# Patient Record
Sex: MALE | Race: WHITE | Employment: FULL TIME | URBAN - METROPOLITAN AREA
[De-identification: names, ages, dates, MRNs, and addresses within clinical notes are randomized per-mention and may not be internally consistent; named-entity substitution may affect disease eponyms.]

---

## 2017-11-01 ENCOUNTER — OFFICE VISIT (OUTPATIENT)
Dept: FAMILY MEDICINE | Facility: CLINIC | Age: 32
End: 2017-11-01
Payer: COMMERCIAL

## 2017-11-01 VITALS
WEIGHT: 186 LBS | RESPIRATION RATE: 16 BRPM | SYSTOLIC BLOOD PRESSURE: 136 MMHG | HEART RATE: 84 BPM | BODY MASS INDEX: 23.56 KG/M2 | DIASTOLIC BLOOD PRESSURE: 96 MMHG

## 2017-11-01 DIAGNOSIS — G56.23 ULNAR NEUROPATHY OF BOTH UPPER EXTREMITIES: ICD-10-CM

## 2017-11-01 DIAGNOSIS — M54.41 CHRONIC BILATERAL LOW BACK PAIN WITH RIGHT-SIDED SCIATICA: Primary | ICD-10-CM

## 2017-11-01 DIAGNOSIS — G89.29 CHRONIC BILATERAL LOW BACK PAIN WITH RIGHT-SIDED SCIATICA: Primary | ICD-10-CM

## 2017-11-01 PROCEDURE — 99214 OFFICE O/P EST MOD 30 MIN: CPT | Performed by: NURSE PRACTITIONER

## 2017-11-01 RX ORDER — CYCLOBENZAPRINE HCL 10 MG
5-10 TABLET ORAL 3 TIMES DAILY PRN
Qty: 30 TABLET | Refills: 1 | Status: SHIPPED | OUTPATIENT
Start: 2017-11-01 | End: 2019-09-24

## 2017-11-01 RX ORDER — NAPROXEN 500 MG/1
500 TABLET ORAL 2 TIMES DAILY PRN
Qty: 30 TABLET | Refills: 1 | Status: SHIPPED | OUTPATIENT
Start: 2017-11-01 | End: 2019-09-24

## 2017-11-01 NOTE — LETTER
[address] [address]  Phone: [phone]      REPORT OF WORK ABILITY    NOTE TO EMPLOYEE: You must promptly provide a copy of this report to your  employer or worker's compensation insurer, and Qualified Rehabilitation Consultant.    Date: 11/1/2017                     Employee Name: Wes Epperson         YOB: 1985  Medical Record Number: [id number]   Soc.Sec.No: [id number]  Employer: [employer]                Date of Injury: 9/2017  Managed Care Organization / Insurance Company Name: UNKNOWN    Diagnosis: right low back pain with radiculopathy, bilateral ulnar neuropathy  Work Related: yes     MMI: UNKNOWN   Permanent Partial Disability(PPD) likely: UNKNOWN    EMPLOYEE IS ABLE TO WORK: with restrictions from 11/2/17 to 11/8/17 -  Full shift     RESTRICTIONS IF ANY:     Lift, carry no more than:  10 lb. or lessOccasionally (2-4 hours)  Push/Pull:  10 lb. or lessOccasionally (2-4 hours)  Hand/Wrist:  both Avoid repetitive motion  Bend:  Not at all (0 hours)  Stoop:  Not at all (0 hours)  Twist:  Not at all (0 hours)    OTHER RESTRICTIONS: restrictions to be reevaluated based on symptoms in 1 week    TREATMENT PLAN/NOTES: seeing physical therapy, bilateral wrist splints.      Maisha Le, CNP

## 2017-11-01 NOTE — NURSING NOTE
"Chief Complaint   Patient presents with     Back Pain     Numbness       Initial BP (!) 136/96 (BP Location: Right arm, Patient Position: Chair, Cuff Size: Adult Regular)  Pulse 84  Resp 16  Wt 186 lb (84.4 kg)  BMI 23.56 kg/m2 Estimated body mass index is 23.56 kg/(m^2) as calculated from the following:    Height as of 10/20/10: 6' 2.5\" (1.892 m).    Weight as of this encounter: 186 lb (84.4 kg).  Medication Reconciliation: complete     Danielle Romero, CMA      "

## 2017-11-01 NOTE — MR AVS SNAPSHOT
After Visit Summary   11/1/2017    Wes Epperson    MRN: [id number]           Patient Information     Date Of Birth          1985        Visit Information        Provider Department      11/1/2017 10:00 AM Maisha Le APRN [facility]        Today's Diagnoses     Chronic bilateral low back pain with right-sided sciatica    -  1    Ulnar neuropathy of both upper extremities          Care Instructions    1.  For back pain start naproxen (antiinflammatory) 1 pill twice a day for 2 weeks.  flexeril (muscle relaxant) 1 tab three times a day as needed for pain, watch for drowsiness, dont drive after taking.  See physical therapy.  If not improving in 2 weeks call for MRI    2.  For hand numbness I think its due to ulnar neuropathy.  Wear wrist splints at night and start naproxen as above.  If not improving consider hand physical therapy    3.  File work comp claim at work.  Light duty x 1 week, check in via phone or follow up visit in 1 week and let me know how things are going.            Follow-ups after your visit        Additional Services     [facility], [facility] REFERRAL       **This order will print in the [facility] Scheduling Office**    Physical Therapy, Hand Therapy and Chiropractic Care are available through:    *[facility]  *[facility]  *[facility]    Call one number to schedule at any of the above locations: [phone].    Your provider has referred you to: Physical Therapy at [facility] or [facility]    Indication/Reason for Referral: Low Back Pain  Onset of Illness: weeks  Therapy Orders: Evaluate and Treat  Special Programs: None  Special Request: None    Rachel Ramos      Additional Comments for the Therapist or Chiropractor:     Please be aware that coverage of these services is subject to the terms and limitations of your health insurance plan.  Call member services at your health plan with any  "benefit or coverage questions.      Please bring the following to your appointment:    *Your personal calendar for scheduling future appointments  *Comfortable clothing                  Who to contact     If you have questions or need follow up information about today's clinic visit or your schedule please contact [facility] directly at [phone].  Normal or non-critical lab and imaging results will be communicated to you by MyChart, letter or phone within 4 business days after the clinic has received the results. If you do not hear from us within 7 days, please contact the clinic through MyChart or phone. If you have a critical or abnormal lab result, we will notify you by phone as soon as possible.  Submit refill requests through [employer] or call your pharmacy and they will forward the refill request to us. Please allow 3 business days for your refill to be completed.          Additional Information About Your Visit        MyChart Information     [employer] lets you send messages to your doctor, view your test results, renew your prescriptions, schedule appointments and more. To sign up, go to [email or web address] . Click on \"Log in\" on the left side of the screen, which will take you to the Welcome page. Then click on \"Sign up Now\" on the right side of the page.     You will be asked to enter the access code listed below, as well as some personal information. Please follow the directions to create your username and password.     Your access code is: [email or web address]  Expires: 2018 10:42 AM     Your access code will  in 90 days. If you need help or a new code, please call your [place] clinic or [phone].        Care EveryWhere ID     This is your Care EveryWhere ID. This could be used by other organizations to access your [facility] medical records  [id number]        Your Vitals Were     Pulse Respirations BMI (Body Mass Index)             84 16 23.56 kg/m2          Blood Pressure from " Last 3 Encounters:   11/01/17 (!) 136/96   08/02/15 110/73   10/20/10 126/80    Weight from Last 3 Encounters:   11/01/17 186 lb (84.4 kg)   10/20/10 232 lb 12.8 oz (105.6 kg)   08/01/06 200 lb (90.7 kg)              We Performed the Following     [facility] REFERRAL          Today's Medication Changes          These changes are accurate as of: 11/1/17 10:42 AM.  If you have any questions, ask your nurse or doctor.               Start taking these medicines.        Dose/Directions    cyclobenzaprine 10 MG tablet   Commonly known as:  FLEXERIL   Used for:  Chronic bilateral low back pain with right-sided sciatica   Started by:  Maisha Le APRN CNP        Dose:  5-10 mg   Take 0.5-1 tablets (5-10 mg) by mouth 3 times daily as needed for muscle spasms   Quantity:  30 tablet   Refills:  1       naproxen 500 MG tablet   Commonly known as:  NAPROSYN   Used for:  Chronic bilateral low back pain with right-sided sciatica   Started by:  Maisha Le APRN CNP        Dose:  500 mg   Take 1 tablet (500 mg) by mouth 2 times daily as needed for moderate pain   Quantity:  30 tablet   Refills:  1            Where to get your medicines      These medications were sent to [facility] [address]  [address], [facility] [address]     Phone:  [phone]     cyclobenzaprine 10 MG tablet    naproxen 500 MG tablet                Primary Care Provider    [facility] Provider Generic, MD       No address on file        Equal Access to Services     VIELKA LOPEZ : Hadii nicole rhoadeso Sobrendan, waaxda luqadaha, qaybta kaalmada adeasad, miguel angel idiroc panda. So [facility] [phone].    ATENCIÓN: Si habla español, tiene a cook disposición servicios gratuitos de asistencia lingüística. Llame al [phone].    We comply with applicable federal civil rights laws and [place] laws. We do not discriminate on the basis of race, color, national  origin, age, disability, sex, sexual orientation, or gender identity.            Thank you!     Thank you for choosing [facility]  for your care. Our goal is always to provide you with excellent care. Hearing back from our patients is one way we can continue to improve our services. Please take a few minutes to complete the written survey that you may receive in the mail after your visit with us. Thank you!             Your Updated Medication List - Protect others around you: Learn how to safely use, store and throw away your medicines at www.disposemymeds.org.          This list is accurate as of: 11/1/17 10:42 AM.  Always use your most recent med list.                   Brand Name Dispense Instructions for use Diagnosis    cyclobenzaprine 10 MG tablet    FLEXERIL    30 tablet    Take 0.5-1 tablets (5-10 mg) by mouth 3 times daily as needed for muscle spasms    Chronic bilateral low back pain with right-sided sciatica       HYDROcodone-acetaminophen 5-325 MG per tablet    NORCO    10 tablet    Take 1 tablet by mouth every 4 hours as needed for moderate to severe pain        naproxen 500 MG tablet    NAPROSYN    30 tablet    Take 1 tablet (500 mg) by mouth 2 times daily as needed for moderate pain    Chronic bilateral low back pain with right-sided sciatica       NO ACTIVE MEDICATIONS      .    Attention to dressings and sutures

## 2017-11-01 NOTE — PATIENT INSTRUCTIONS
1.  For back pain start naproxen (antiinflammatory) 1 pill twice a day for 2 weeks.  flexeril (muscle relaxant) 1 tab three times a day as needed for pain, watch for drowsiness, dont drive after taking.  See physical therapy.  If not improving in 2 weeks call for MRI    2.  For hand numbness I think its due to ulnar neuropathy.  Wear wrist splints at night and start naproxen as above.  If not improving consider hand physical therapy    3.  File work comp claim at work.  Light duty x 1 week, check in via phone or follow up visit in 1 week and let me know how things are going.

## 2017-11-01 NOTE — PROGRESS NOTES
SUBJECTIVE:   Wes Epperson is a 32 year old male who presents to clinic today for the following health issues:      Back Pain       Duration: 1 month        Specific cause: work-related with sharp pain about 1 month ago    Description:   Location of pain: low back middle  Character of pain: sharp and shooting  Pain radiation:into chest  New numbness or weakness in legs, not attributed to pain:  YES- numbness in both arms and legs    Intensity: Currently 2/10, At its worst 6/10    History:   Pain interferes with job: yes, sometimes when bending over and lifting  History of back problems: no prior back problems  Any previous MRI or X-rays: None  Sees a specialist for back pain:  No  Therapies tried without relief: none    Alleviating factors:   Improved by: smoking marijuana and stretching, hot bath      Precipitating factors:  Worsened by: Lifting and Bending    Functional and Psychosocial Screen (Rachel STarT Back):      Not performed today      HPI: Patient reported lower back pain, which started a month ago after he sustain injury; 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 Pain is located at right lower back, Pain radiates to  posterior thigh at knee level.  pain is described as burning sensation.  Pain is intermittent during the first week then get better and  got worse 2 days ago.  Pain is aggravated by walking and lifting objects.  Pain is rated at 7/10. No history of prior occurence.  Patient take marijuana and perform stretches which relived pain a little. He was unable to attend work today.      No fever, chills,  bowel or bladder incontinence     Left toe tingling intermittent.  Ongoing several weeks.    Numbness/tingling to bilateral arms, starts in elbow and radiates down to ulnar aspect hand and wrist.  Squeezes and and shakes it out, sometimes improves.  When tingling is present its hard to , ongoing since back injury.  Occurring to bilateral hands, right is worse.  No neck pain.       Not currently on light duty.    Has lost 100 pounds.  Intentional wt loss.  Wondering if this plays a role in back pain.    There is no problem list on file for this patient.    History reviewed. No pertinent surgical history.    Social History   Substance Use Topics     Smoking status: Current Every Day Smoker     Packs/day: 0.50     Years: 6.00     Types: Cigarettes     Smokeless tobacco: Never Used     Alcohol use Yes      Comment: rarely     History reviewed. No pertinent family history.      Current Outpatient Prescriptions   Medication Sig Dispense Refill     naproxen (NAPROSYN) 500 MG tablet Take 1 tablet (500 mg) by mouth 2 times daily as needed for moderate pain 30 tablet 1     cyclobenzaprine (FLEXERIL) 10 MG tablet Take 0.5-1 tablets (5-10 mg) by mouth 3 times daily as needed for muscle spasms 30 tablet 1     NO ACTIVE MEDICATIONS .       HYDROcodone-acetaminophen (NORCO) 5-325 MG per tablet Take 1 tablet by mouth every 4 hours as needed for moderate to severe pain (Patient not taking: Reported on 11/1/2017) 10 tablet 0       ROS:  C: NEGATIVE for fever, chills, change in weight  E/M: NEGATIVE for ear, mouth and throat problems  R: NEGATIVE for significant cough or SOB  CV: NEGATIVE for chest pain, palpitations or peripheral edema  : negative for dysuria, hematuria, decreased urinary stream, erectile dysfunction  MUSCULOSKELETAL: POSITIVE  As above   NEURO: As above      OBJECTIVE:                                                    BP (!) 136/96 (BP Location: Right arm, Patient Position: Chair, Cuff Size: Adult Regular)  Pulse 84  Resp 16  Wt 186 lb (84.4 kg)  BMI 23.56 kg/m2   GENERAL APPEARANCE: healthy, alert and no distress  NECK: no adenopathy, no asymmetry, masses, or scars and thyroid normal to palpation  RESP: lungs clear to auscultation - no rales, rhonchi or wheezes  CV: regular rates and rhythm, normal S1 S2, no S3 or S4 and no murmur, click or rub  NEURO: Normal strength and tone,  mentation intact, speech normal, DTR symmetrically normal in lower extremities and gait normal  ORTHO: Lumber/Thoracic Spine Exam: Tender:  right para lumbar muscles  Non-tender:  lumbar spinous processes, left para lumbar muscles  Range of Motion:  Tenderness of to right side of lower back,while performing forward flextion and twisting and extenion of back.  Range of motion not limited by pain  Strength:  able to heel walk, able to toe walk  Special tests:  negative straight leg raises    BILATERAL Elbow Exam: Inspection: no swelling, no ecchymosis, no olecranon bursa swelling, no distal bicep tendon defect  Tender: ulnar groove bilaterally  Non-tender: olecranon bursa and bilateral epicondyl   Range of Motion: flexion:  all normal  Strength: elbow strength full    BILATERAL Wrist Exam: WRIST:  Inspection: no effusion  Range of Motion: normal  Strength: no deficits  Special tests: negative Tinel's, finkelstein, and Phalen's test.         ASSESSMENT/PLAN:                                                    (M54.41,  G89.29) Chronic bilateral low back pain with right-sided sciatica  (primary encounter diagnosis)  Comment: No red flag symptoms for cauda equina these include sharp or stabbing pain in legs or lower extremities bladder and bowel incontinent.  base on symptoms most likely Sciatica v. myofacia pain  Plan: For back pain start naproxen (antiinflammatory) 1 pill twice a day for 2 weeks.  flexeril (muscle relaxant) 1 tab three times a day as needed for pain, watch for drowsiness, dont drive after taking.  See physical therapy.  If not improving in 2 weeks call for MRI.  Letter for work restrictions x 1 week, pt to call in 1 week or follow up in office and will reevaluate.      [facility] REFERRAL,         naproxen (NAPROSYN) 500 MG tablet,         cyclobenzaprine (FLEXERIL) 10 MG tablet            (G56.23) Ulnar neuropathy of both upper extremities  Comment: no neck pain to suggest cervical  radiculopathy, exam consistent with ulnar neuropathy  Plan:Wear wrist splints at night and start naproxen as above.  If not improving consider hand physical therapy.  Work restrictions completed.      See Patient Instructions    Maisha Le CNP  [facility]    Patient Instructions   1.  For back pain start naproxen (antiinflammatory) 1 pill twice a day for 2 weeks.  flexeril (muscle relaxant) 1 tab three times a day as needed for pain, watch for drowsiness, dont drive after taking.  See physical therapy.  If not improving in 2 weeks call for MRI    2.  For hand numbness I think its due to ulnar neuropathy.  Wear wrist splints at night and start naproxen as above.  If not improving consider hand physical therapy    3.  File work comp claim at work.  Light duty x 1 week, check in via phone or follow up visit in 1 week and let me know how things are going.

## 2019-06-07 ENCOUNTER — OFFICE VISIT (OUTPATIENT)
Dept: URGENT CARE | Facility: URGENT CARE | Age: 34
End: 2019-06-07
Payer: COMMERCIAL

## 2019-06-07 VITALS
HEIGHT: 74 IN | WEIGHT: 189 LBS | BODY MASS INDEX: 24.26 KG/M2 | DIASTOLIC BLOOD PRESSURE: 80 MMHG | OXYGEN SATURATION: 97 % | SYSTOLIC BLOOD PRESSURE: 130 MMHG | TEMPERATURE: 99 F | HEART RATE: 89 BPM

## 2019-06-07 DIAGNOSIS — N50.89 TESTICULAR LUMP: Primary | ICD-10-CM

## 2019-06-07 LAB
ALBUMIN UR-MCNC: NEGATIVE MG/DL
APPEARANCE UR: CLEAR
BILIRUB UR QL STRIP: NEGATIVE
COLOR UR AUTO: YELLOW
GLUCOSE UR STRIP-MCNC: NEGATIVE MG/DL
HGB UR QL STRIP: NEGATIVE
KETONES UR STRIP-MCNC: NEGATIVE MG/DL
LEUKOCYTE ESTERASE UR QL STRIP: NEGATIVE
NITRATE UR QL: NEGATIVE
PH UR STRIP: 7 PH (ref 5–7)
SOURCE: NORMAL
SP GR UR STRIP: 1.02 (ref 1–1.03)
UROBILINOGEN UR STRIP-ACNC: 1 EU/DL (ref 0.2–1)

## 2019-06-07 PROCEDURE — 81003 URINALYSIS AUTO W/O SCOPE: CPT | Performed by: FAMILY MEDICINE

## 2019-06-07 PROCEDURE — 99214 OFFICE O/P EST MOD 30 MIN: CPT | Performed by: FAMILY MEDICINE

## 2019-06-07 ASSESSMENT — MIFFLIN-ST. JEOR: SCORE: 1857.05

## 2019-06-08 NOTE — PROGRESS NOTES
SUBJECTIVE:   Chief Complaint   Patient presents with     Urgent Care     Pain     For past year has had intermittent shooting pain in left testicle; last few months has become constant; worse in last few weeks, rated 8/10.  2 weeks ago started to notice a lump on left testicle.  2 days ago started to have pain in right testicle as well.  Has been taking tylenol and aspirin but has not helped pain.       Concern: Testicular pain   Description of the problem : Patient is a 34-year-old male with a past medical history of tobacco abuse who presents to the clinic for evaluation of left testicular pain.  He reports that symptoms initially started about a year ago  Pain only occurred intermittently.  His symptoms progressed and now he has constant pain in the past few weeks.  He rates pain at 8/10 in severity.  He denies any urinary frequency, urgency, dysuria, and urethral discharge.  He also denies any abdominal pain, fever, chills, nausea, and vomiting.      Review of Systems review of system negative except as mentioned in HPI.       No past medical history on file.  No family history on file.  Current Outpatient Medications   Medication Sig Dispense Refill     Acetaminophen 325 MG CAPS Take 325-650 mg by mouth every 4 hours as needed       aspirin (ASA) 325 MG EC tablet Take 650 mg by mouth every 6 hours as needed for moderate pain       cyclobenzaprine (FLEXERIL) 10 MG tablet Take 0.5-1 tablets (5-10 mg) by mouth 3 times daily as needed for muscle spasms (Patient not taking: Reported on 6/7/2019) 30 tablet 1     HYDROcodone-acetaminophen (NORCO) 5-325 MG per tablet Take 1 tablet by mouth every 4 hours as needed for moderate to severe pain (Patient not taking: Reported on 11/1/2017) 10 tablet 0     naproxen (NAPROSYN) 500 MG tablet Take 1 tablet (500 mg) by mouth 2 times daily as needed for moderate pain (Patient not taking: Reported on 6/7/2019) 30 tablet 1     NO ACTIVE MEDICATIONS .       Social History  "    Tobacco Use     Smoking status: Current Every Day Smoker     Packs/day: 0.50     Years: 6.00     Pack years: 3.00     Types: Cigarettes     Smokeless tobacco: Never Used   Substance Use Topics     Alcohol use: Yes     Comment: rarely       OBJECTIVE  /80   Pulse 89   Temp 99  F (37.2  C) (Oral)   Ht 1.88 m (6' 2\")   Wt 85.7 kg (189 lb)   SpO2 97%   BMI 24.27 kg/m      Physical Exam   Constitutional: He appears well-developed and well-nourished. No distress.   HENT:   Head: Normocephalic and atraumatic.   Eyes: Conjunctivae are normal.   Genitourinary:   Genitourinary Comments: Left testicle tenderness on palpation.  Patient also has 3 to 4 cm lump around 12:00 region.  No signs of varicocele, hydrocele, or hernia noted.  No urethral discharge noted   Neurological: He is alert.   Skin: Skin is warm. He is not diaphoretic.   Psychiatric: He has a normal mood and affect.       Labs:  Results for orders placed or performed in visit on 06/07/19 (from the past 24 hour(s))   *UA reflex to Microscopic and Culture ([place] and [facility] (except [place] and [place])   Result Value Ref Range    Color Urine Yellow     Appearance Urine Clear     Glucose Urine Negative NEG^Negative mg/dL    Bilirubin Urine Negative NEG^Negative    Ketones Urine Negative NEG^Negative mg/dL    Specific Gravity Urine 1.020 1.003 - 1.035    Blood Urine Negative NEG^Negative    pH Urine 7.0 5.0 - 7.0 pH    Protein Albumin Urine Negative NEG^Negative mg/dL    Urobilinogen Urine 1.0 0.2 - 1.0 EU/dL    Nitrite Urine Negative NEG^Negative    Leukocyte Esterase Urine Negative NEG^Negative    Source Midstream Urine        X-Ray was not done.    ASSESSMENT:    Wes was seen today for urgent care and pain.    Diagnoses and all orders for this visit:    Testicular lump:  This is a 34-year-old male who presents to clinic for evaluation of left testicle lump possible differentials includes testicular cancer versus spermatocele versus " varicocele. His physical examination was remarkable for left testicular tenderness with palpable nodule. Recommended testicular ultrasound for further evaluation. Urinalysis was unremarkable.  -     US Testicular and Scrotum; Future    Other orders  -     *UA reflex to Microscopic and Culture ([place] and [facility] (except [place] and [place])      Tash Amezquita MD

## 2019-06-10 ENCOUNTER — ANCILLARY PROCEDURE (OUTPATIENT)
Dept: ULTRASOUND IMAGING | Facility: CLINIC | Age: 34
End: 2019-06-10
Attending: FAMILY MEDICINE
Payer: COMMERCIAL

## 2019-06-10 DIAGNOSIS — N50.89 TESTICULAR LUMP: ICD-10-CM

## 2019-07-10 ENCOUNTER — OFFICE VISIT (OUTPATIENT)
Dept: FAMILY MEDICINE | Facility: CLINIC | Age: 34
End: 2019-07-10
Payer: COMMERCIAL

## 2019-07-10 VITALS
HEART RATE: 83 BPM | SYSTOLIC BLOOD PRESSURE: 132 MMHG | BODY MASS INDEX: 25.16 KG/M2 | RESPIRATION RATE: 16 BRPM | OXYGEN SATURATION: 95 % | DIASTOLIC BLOOD PRESSURE: 104 MMHG | WEIGHT: 196 LBS

## 2019-07-10 DIAGNOSIS — I86.1 BILATERAL VARICOCELES: ICD-10-CM

## 2019-07-10 DIAGNOSIS — F17.200 TOBACCO DEPENDENCE SYNDROME: Primary | ICD-10-CM

## 2019-07-10 PROCEDURE — 99214 OFFICE O/P EST MOD 30 MIN: CPT | Performed by: FAMILY MEDICINE

## 2019-07-10 NOTE — PROGRESS NOTES
Subjective     Wes Epperson is a 34 year old male who presents to clinic today for the following health issues:    HPI     Testicular pain described as chronic aching for the past year, initially left only, but now involving both testicles, and now associated with a lump in left testicle that has been growing over the past few weeks. He was seen in  a few days ago and had a testicular US that showed bilateral vericocele.This is a new diagnosis for him. Pain is aggravated by activity (walking). He denies any triggering events (ie, denies, heavy lifting,etc)    He denies any risk of STD, sexual dysfunction, penile discharge, fevers, chills, urinary or stool changes.    Pain        For past year has had intermittent shooting pain in left testicle; last few months has become constant; worse in last few weeks, rated 8/10.  2 weeks ago started to notice a lump on left testicle.  2 days ago started to have pain in right testicle as well.  Has been taking tylenol and aspirin but has not helped pain.      Pain is still there and it is radiating to his abdomin area (left side) . The pain is sharp and has been constant lately. They told him that there may be some sort or blockage there. It is worse when he is sitting or driving.   He also noticed that his vein are a dark blue/ purple and only certain parts are like that. Tender to the touch.       There is no problem list on file for this patient.    Past Surgical History:   Procedure Laterality Date     NO HISTORY OF SURGERY         Social History     Tobacco Use     Smoking status: Current Every Day Smoker     Packs/day: 0.50     Years: 6.00     Pack years: 3.00     Types: Cigarettes     Smokeless tobacco: Never Used   Substance Use Topics     Alcohol use: Yes     Comment: rarely     No family history on file.      Current Outpatient Medications   Medication Sig Dispense Refill     varenicline (CHANTIX TARYN) 0.5 MG X 11 & 1 MG X 42 tablet Take 0.5 mg tab daily for 3  days, THEN 0.5 mg tab twice daily for 4 days, THEN 1 mg twice daily. 53 tablet 0     Acetaminophen 325 MG CAPS Take 325-650 mg by mouth every 4 hours as needed       aspirin (ASA) 325 MG EC tablet Take 650 mg by mouth every 6 hours as needed for moderate pain       cyclobenzaprine (FLEXERIL) 10 MG tablet Take 0.5-1 tablets (5-10 mg) by mouth 3 times daily as needed for muscle spasms (Patient not taking: Reported on 6/7/2019) 30 tablet 1     HYDROcodone-acetaminophen (NORCO) 5-325 MG per tablet Take 1 tablet by mouth every 4 hours as needed for moderate to severe pain (Patient not taking: Reported on 11/1/2017) 10 tablet 0     naproxen (NAPROSYN) 500 MG tablet Take 1 tablet (500 mg) by mouth 2 times daily as needed for moderate pain (Patient not taking: Reported on 6/7/2019) 30 tablet 1     NO ACTIVE MEDICATIONS .       No Known Allergies  BP Readings from Last 3 Encounters:   07/10/19 (!) 132/104   06/07/19 130/80   11/01/17 (!) 136/96    Wt Readings from Last 3 Encounters:   07/10/19 88.9 kg (196 lb)   06/07/19 85.7 kg (189 lb)   11/01/17 84.4 kg (186 lb)        Reviewed and updated as needed this visit by Provider  Med Hx         Review of Systems   ROS COMP: Constitutional, HEENT, cardiovascular, pulmonary, GI, , musculoskeletal, neuro, skin, endocrine and psych systems are negative, except as otherwise noted.      Objective    BP (!) 132/104 (BP Location: Left arm, Patient Position: Sitting, Cuff Size: Adult Large)   Pulse 83   Resp 16   Wt 88.9 kg (196 lb)   SpO2 95%   BMI 25.16 kg/m    Body mass index is 25.16 kg/m .  Physical Exam   GENERAL: healthy, alert and no distress  NECK: no adenopathy, no asymmetry, masses, or scars and thyroid normal to palpation  RESP: lungs clear to auscultation - no rales, rhonchi or wheezes  CV: regular rate and rhythm, normal S1 S2, no S3 or S4, no murmur, click or rub, no peripheral edema and peripheral pulses strong  ABDOMEN: soft, nontender, no hepatosplenomegaly, no  masses and bowel sounds normal   (male): testicular mass noted of left testicle, mildly tender to touch, without, epididymal enlargement, and no hydrocele present, no hernias and normal circumcised penis without lesions or urethral discharge  MS: no gross musculoskeletal defects noted, no edema    Diagnostic Test Results:  Labs reviewed in Epic; I reviewed recent scrotal US with Wes today        Assessment & Plan     1. Bilateral varicoceles  New diagnosis for him today, symptomatic  New, previously undiagnosed problem with uncertain prognosis and additional work-up planned.   Recommend referral to Urology for evaluation and treatment  Please see patient instructions below.   - UROLOGY ADULT REFERRAL    2. Tobacco dependence syndrome  He mentioned wanting to quit, I discussed pros and cons of quit aids, he elected Chantix, see orders  - varenicline (CHANTIX TARYN) 0.5 MG X 11 & 1 MG X 42 tablet; Take 0.5 mg tab daily for 3 days, THEN 0.5 mg tab twice daily for 4 days, THEN 1 mg twice daily.  Dispense: 53 tablet; Refill: 0     Tobacco Cessation:   reports that he has been smoking cigarettes.  He has a 3.00 pack-year smoking history. He has never used smokeless tobacco.  Tobacco Cessation Action Plan: Pharmacotherapies : Chantix      Return in about 1 week (around 7/17/2019) for urology consult.    Bambi Bai MD  [facility]

## 2019-07-10 NOTE — PATIENT INSTRUCTIONS
Patient Education     What is Varicocele?     Front view of the penis showing veins, penis, and varicocele     A varicocele is a swelling in the veins above the testicles. It is similar to having varicose veins in the legs. The swelling occurs when too much blood collects in the veins because they are damaged. A varicocele most often occurs around the left testicle.  Veins in the scrotum  The scrotum is a sac of skin that covers the testicles -- the male sex organs that produce sperm and the male hormones. Blood vessels in the scrotum carry blood to and from the testicles. The vessels that carry blood away from the testicles are called veins.  When there s a problem in the veins  The veins that carry blood from the testicles extend up into the groin. That means the blood has to travel upward a long way. Valves in the veins act like martinez to keep the blood from flowing back toward the testicles. In some men, these valves don t close fully. Or the muscles in the walls of the veins may be weak. Then some blood flows back into the scrotum and collects in the veins above the testicles. This makes the veins enlarge.  What are the symptoms?  A varicocele often causes no symptoms at all. Or it may cause an achy or heavy feeling in the scrotum. The pain may be worse later in the day or after standing for a long time. You may also see swollen veins just under the skin in the scrotum.  A varicocele can lower sperm count  When blood collects in the veins above the testicles, changes occur that can reduce the number and the quality of the sperm. In many cases, sperm count improves after treatment.   Date Last Reviewed: 1/1/2017 2000-2018 [employer]. [address] [place] [address]. All rights reserved. This information is not intended as a substitute for professional medical care. Always follow your healthcare professional's instructions.           Patient Education     Treating Varicocele    In  most cases, a varicocele is not serious. Your doctor may wait and watch the problem for a while. If needed, surgery or another procedure is done to close off the enlarged veins. This may be suggested if you have pain, if the veins become unsightly, or if you and your partner are having trouble conceiving a baby.  Open varicocelectomy surgery  Your healthcare provider may recommend surgery to tie off the enlarged veins around the testicles:    You are given anesthesia to keep you comfortable. You may or may not be asleep, depending on the medicine you are given.     An incision is made in the groin or in the lower abdomen.    The veins are then cut and tied off.    The incision is closed with sutures, staples, or surgical tape.  Laparoscopic varicocelectomy surgery  Instead of open surgery, laparoscopic surgery may be recommended. This is surgery done through small incisions with an instrument called a laparoscope (a thin, telescope-like device):    You are given general anesthesia to make you sleep during the procedure.    A few small openings are made in the lower abdomen. The laparoscope is inserted through a single opening. Surgical instruments are inserted through the other small openings.    The laparoscope sends magnified pictures to a video monitor. Using these pictures, the surgeon identifies the veins that need treatment.    The veins are clamped to seal them off.    The instruments are removed. The incisions are closed with sutures, staples, or surgical tape.  Percutaneous embolization  In place of surgery, your healthcare provider might recommend sealing off the enlarged veins using percutaneous embolization. A radiologic procedure called a venogram is used to create a map of the veins. A tube is then placed in the large vein in the groin. Materials are injected through this tube into the enlarged veins to block them off.  After your paricocele procedure    You may feel some pain in your testicle for a  few days.    Mild swelling around the testicle is normal after the procedure. Put an ice pack (or bag of frozen peas or rice) wrapped in a thin towel on the area to help. Do this for no longer than 20 minutes at a time.    Plan to rest for 5 to 7 days.  When to call your healthcare provider  Contact your healthcare provider right away if you have:     Ongoing pain not relieved by pain medicine    Black-and-blue around the incision, bleeding from the incision, or swelling in the scrotum    A fever above 100.4 F (38 C)    Greenish or foul-smelling drainage from the incision      Risks of varicocele repair  Risks and possible complications of these procedures include:    Blood clot    Infection    Fluid accumulation around testicle (hydrocele)    Injury to the nerves in the groin or scrotum    Injury to scrotal tissue or structures    Injury to the artery that supplies blood to the testicle    Risks of general anesthesia, if used    Damage to abdominal structures (laparoscopic surgery)   Date Last Reviewed: 1/1/2017 2000-2018 [employer]. [address] [place] [address]. All rights reserved. This information is not intended as a substitute for professional medical care. Always follow your healthcare professional's instructions.

## 2019-08-03 ENCOUNTER — OFFICE VISIT (OUTPATIENT)
Dept: UROLOGY | Facility: CLINIC | Age: 34
End: 2019-08-03
Attending: FAMILY MEDICINE
Payer: COMMERCIAL

## 2019-08-03 ENCOUNTER — PRE VISIT (OUTPATIENT)
Dept: UROLOGY | Facility: CLINIC | Age: 34
End: 2019-08-03

## 2019-08-03 VITALS
DIASTOLIC BLOOD PRESSURE: 95 MMHG | HEIGHT: 74 IN | BODY MASS INDEX: 25.15 KG/M2 | HEART RATE: 62 BPM | SYSTOLIC BLOOD PRESSURE: 130 MMHG | WEIGHT: 196 LBS

## 2019-08-03 DIAGNOSIS — I86.1 BILATERAL VARICOCELES: Primary | ICD-10-CM

## 2019-08-03 ASSESSMENT — PAIN SCALES - GENERAL: PAINLEVEL: MODERATE PAIN (5)

## 2019-08-03 ASSESSMENT — MIFFLIN-ST. JEOR: SCORE: 1888.8

## 2019-08-03 NOTE — LETTER
"8/3/2019       RE: Wes Epperson  [address]  [facility] [address]     Dear Colleague,    Thank you for referring your patient, Wes Epperson, to the [facility] UROLOG[facility] PROSTATE [facility]OLOG[facility]. Please see a copy of my visit note below.            Chief Complaint:   Bilateral varicoceles         History of Present Illness:    Wes Epperson is a very pleasant 34 year old male who presents for evaluation of the above. He was recently seen in urgent care with intermittent shooting pain in the left testicle for the past few months, which had become constant and worse over the past few weeks. He also reportedly noticed a lump on his left testicle, as well. The pain then spread to his right testicle, which prompted him to be seen. A scrotal US was obtained, which revealed bilateral varicoceles, but no testicular mass.    Today, he states that he has actually been experiencing this pain to some degree for the past year. Only over the past few months has this pain begun to worsen. At its worst, he rates it at a 8/10 in severity. He is a  and finds it to be particularly painful when he is active and working. He is interested in pursuing repair surgery.     He also mentions today that last evening, he had an episode of mid-sternal chest pain that lasted \"a few minutes.\" He denies a history of GERD. After a few minutes, it passed, and he has not had this again since. This was not associated with any pain/numbness elsewhere in the body or shortness of breath.          Past Medical History:     Past Medical History:   Diagnosis Date     NO ACTIVE PROBLEMS             Past Surgical History:     Past Surgical History:   Procedure Laterality Date     NO HISTORY OF SURGERY              Medications     Current Outpatient Medications   Medication     varenicline (CHANTIX TARYN) 0.5 MG X 11 & 1 MG X 42 tablet     Acetaminophen 325 MG CAPS     aspirin " (ASA) 325 MG EC tablet     cyclobenzaprine (FLEXERIL) 10 MG tablet     HYDROcodone-acetaminophen (NORCO) 5-325 MG per tablet     naproxen (NAPROSYN) 500 MG tablet     NO ACTIVE MEDICATIONS     No current facility-administered medications for this visit.             Family History:   History reviewed. No pertinent family history.         Social History:     Social History     Socioeconomic History     Marital status:      Spouse name: Not on file     Number of children: Not on file     Years of education: Not on file     Highest education level: Not on file   Occupational History     Not on file   Social Needs     Financial resource strain: Not on file     Food insecurity:     Worry: Not on file     Inability: Not on file     Transportation needs:     Medical: Not on file     Non-medical: Not on file   Tobacco Use     Smoking status: Current Every Day Smoker     Packs/day: 0.50     Years: 6.00     Pack years: 3.00     Types: Cigarettes     Smokeless tobacco: Never Used   Substance and Sexual Activity     Alcohol use: Yes     Comment: rarely     Drug use: No     Sexual activity: Never   Lifestyle     Physical activity:     Days per week: Not on file     Minutes per session: Not on file     Stress: Not on file   Relationships     Social connections:     Talks on phone: Not on file     Gets together: Not on file     Attends [employer] service: Not on file     Active member of club or organization: Not on file     Attends meetings of clubs or organizations: Not on file     Relationship status: Not on file     Intimate partner violence:     Fear of current or ex partner: Not on file     Emotionally abused: Not on file     Physically abused: Not on file     Forced sexual activity: Not on file   Other Topics Concern     Parent/sibling w/ CABG, MI or angioplasty before 65F 55M? Not Asked   Social History Narrative     Not on file            Allergies:   Patient has no known allergies.         Review of Systems:  From  "intake questionnaire   Negative 14 system review except as noted on HPI, nurse's note.         Physical Exam:   Patient is a 34 year old  male   Vitals: Blood pressure (!) 130/95, pulse 62, height 1.88 m (6' 2\"), weight 88.9 kg (196 lb).  General Appearance Adult: Alert, no acute distress, oriented  Lungs: no respiratory distress, or pursed lip breathing  Heart: No obvious jugular venous distension present  Abdomen: soft, nontender, no organomegaly or masses, Body mass index is 25.16 kg/m .  Musculoskeltal: extremities normal, no peripheral edema  Skin: no suspicious lesions or rashes  Neuro: Alert, oriented, speech and mentation normal  : deferred      Labs and Pathology:    I personally reviewed all applicable laboratory data.    UA RESULTS:   Recent Labs   Lab Test 06/07/19  1853   SG 1.020   URINEPH 7.0   NITRITE Negative         Imaging:    I personally reviewed all applicable imaging and went over findings with patient.  Significant for:    Results for orders placed or performed in visit on 06/10/19   US Testicular and Scrotum    Narrative    EXAMINATION: US TESTICULAR AND SCROTAL, 6/10/2019 1:53 PM     COMPARISON: None.    HISTORY: Testicular lump    TECHNIQUE: The scrotum was scanned in standard fashion with  specialized ultrasound transducer(s) using both grey scale and limited  color Doppler techniques.    Findings:  The testes demonstrate normal and symmetric echotexture and  vascularity. No evidence of a focal lesion.  The right testicle  measures 4.6 x 2.8 x 3.1 cm and the left measures 4.5 x 2.8 x 2.9 cm.  There is no evidence of torsion.    There is no evidence of a significant hydrocele. Bilateral  varicoceles, left greater than right.    Both the right and left epididymis are within normal limits.      Impression    Impression:   1.  No evidence of a testicular mass.  2.  Bilateral varicoceles, left greater than right.    I have personally reviewed the examination and initial interpretation  and I " agree with the findings.    KATHRINE PERDOMO MD            Assessment and Plan:     Assessment: 34 year old male recently found to have bilateral varicoceles on scrotal US. Today, he is interested in pursuing surgical repair. He also reports a recent episode of chest pain, which lasted a few minutes and has not returned. BP was mildly elevated today, but not concerning.     Plan:  -Schedule visit with Dr. Long to further discuss varicocele repair.  -Instructed to seek urgent/emergent care if chest pain recurs and does not resolve, or is associated with other warning signs, including dizziness or shortness of breath.     Nolvia Conklin, CNP  Department of Urology

## 2019-08-03 NOTE — TELEPHONE ENCOUNTER
Reason for Visit: Consult    Diagnosis: varicocele, testicular pain    Orders/Procedures/Records: in system    Contact Patient: n/a    Rooming Requirements: normal      Ashwini Gillespie LPN  08/03/19  7:42 AM

## 2019-08-03 NOTE — PATIENT INSTRUCTIONS
UROLOGY CLINIC VISIT PATIENT INSTRUCTIONS    1) Schedule a visit with Dr. Jayro Long to discuss surgical repair of your varicocele.   2) If chest pain returns and does not resolve, seek urgent/emergent care.     If you have any issues, questions or concerns in the meantime, do not hesitate to contact us at [phone] or via [employer].     It was a pleasure meeting with you today.  Thank you for allowing me and my team the privilege of caring for you today.  YOU are the reason we are here, and I truly hope we provided you with the excellent service you deserve.  Please let us know if there is anything else we can do for you so that we can be sure you are leaving completely satisfied with your care experience.

## 2019-08-03 NOTE — PROGRESS NOTES
"        Chief Complaint:   Bilateral varicoceles         History of Present Illness:    Wes Epperson is a very pleasant 34 year old male who presents for evaluation of the above. He was recently seen in urgent care with intermittent shooting pain in the left testicle for the past few months, which had become constant and worse over the past few weeks. He also reportedly noticed a lump on his left testicle, as well. The pain then spread to his right testicle, which prompted him to be seen. A scrotal US was obtained, which revealed bilateral varicoceles, but no testicular mass.    Today, he states that he has actually been experiencing this pain to some degree for the past year. Only over the past few months has this pain begun to worsen. At its worst, he rates it at a 8/10 in severity. He is a  and finds it to be particularly painful when he is active and working. He is interested in pursuing repair surgery.     He also mentions today that last evening, he had an episode of mid-sternal chest pain that lasted \"a few minutes.\" He denies a history of GERD. After a few minutes, it passed, and he has not had this again since. This was not associated with any pain/numbness elsewhere in the body or shortness of breath.          Past Medical History:     Past Medical History:   Diagnosis Date     NO ACTIVE PROBLEMS             Past Surgical History:     Past Surgical History:   Procedure Laterality Date     NO HISTORY OF SURGERY              Medications     Current Outpatient Medications   Medication     varenicline (CHANTIX TARYN) 0.5 MG X 11 & 1 MG X 42 tablet     Acetaminophen 325 MG CAPS     aspirin (ASA) 325 MG EC tablet     cyclobenzaprine (FLEXERIL) 10 MG tablet     HYDROcodone-acetaminophen (NORCO) 5-325 MG per tablet     naproxen (NAPROSYN) 500 MG tablet     NO ACTIVE MEDICATIONS     No current facility-administered medications for this visit.             Family History:   History reviewed. No pertinent " "family history.         Social History:     Social History     Socioeconomic History     Marital status:      Spouse name: Not on file     Number of children: Not on file     Years of education: Not on file     Highest education level: Not on file   Occupational History     Not on file   Social Needs     Financial resource strain: Not on file     Food insecurity:     Worry: Not on file     Inability: Not on file     Transportation needs:     Medical: Not on file     Non-medical: Not on file   Tobacco Use     Smoking status: Current Every Day Smoker     Packs/day: 0.50     Years: 6.00     Pack years: 3.00     Types: Cigarettes     Smokeless tobacco: Never Used   Substance and Sexual Activity     Alcohol use: Yes     Comment: rarely     Drug use: No     Sexual activity: Never   Lifestyle     Physical activity:     Days per week: Not on file     Minutes per session: Not on file     Stress: Not on file   Relationships     Social connections:     Talks on phone: Not on file     Gets together: Not on file     Attends Latter day service: Not on file     Active member of club or organization: Not on file     Attends meetings of clubs or organizations: Not on file     Relationship status: Not on file     Intimate partner violence:     Fear of current or ex partner: Not on file     Emotionally abused: Not on file     Physically abused: Not on file     Forced sexual activity: Not on file   Other Topics Concern     Parent/sibling w/ CABG, MI or angioplasty before 65F 55M? Not Asked   Social History Narrative     Not on file            Allergies:   Patient has no known allergies.         Review of Systems:  From intake questionnaire   Negative 14 system review except as noted on HPI, nurse's note.         Physical Exam:   Patient is a 34 year old  male   Vitals: Blood pressure (!) 130/95, pulse 62, height 1.88 m (6' 2\"), weight 88.9 kg (196 lb).  General Appearance Adult: Alert, no acute distress, oriented  Lungs: no " respiratory distress, or pursed lip breathing  Heart: No obvious jugular venous distension present  Abdomen: soft, nontender, no organomegaly or masses, Body mass index is 25.16 kg/m .  Musculoskeltal: extremities normal, no peripheral edema  Skin: no suspicious lesions or rashes  Neuro: Alert, oriented, speech and mentation normal  : deferred      Labs and Pathology:    I personally reviewed all applicable laboratory data.    UA RESULTS:   Recent Labs   Lab Test 06/07/19  1853   SG 1.020   URINEPH 7.0   NITRITE Negative         Imaging:    I personally reviewed all applicable imaging and went over findings with patient.  Significant for:    Results for orders placed or performed in visit on 06/10/19   US Testicular and Scrotum    Narrative    EXAMINATION: US TESTICULAR AND SCROTAL, 6/10/2019 1:53 PM     COMPARISON: None.    HISTORY: Testicular lump    TECHNIQUE: The scrotum was scanned in standard fashion with  specialized ultrasound transducer(s) using both grey scale and limited  color Doppler techniques.    Findings:  The testes demonstrate normal and symmetric echotexture and  vascularity. No evidence of a focal lesion.  The right testicle  measures 4.6 x 2.8 x 3.1 cm and the left measures 4.5 x 2.8 x 2.9 cm.  There is no evidence of torsion.    There is no evidence of a significant hydrocele. Bilateral  varicoceles, left greater than right.    Both the right and left epididymis are within normal limits.      Impression    Impression:   1.  No evidence of a testicular mass.  2.  Bilateral varicoceles, left greater than right.    I have personally reviewed the examination and initial interpretation  and I agree with the findings.    KATHRINE PERDOMO MD            Assessment and Plan:     Assessment: 34 year old male recently found to have bilateral varicoceles on scrotal US. Today, he is interested in pursuing surgical repair. He also reports a recent episode of chest pain, which lasted a few minutes and has not  returned. BP was mildly elevated today, but not concerning.     Plan:  -Schedule visit with Dr. Long to further discuss varicocele repair.  -Instructed to seek urgent/emergent care if chest pain recurs and does not resolve, or is associated with other warning signs, including dizziness or shortness of breath.     Nolvia Conklin, CNP  Department of Urology

## 2019-08-03 NOTE — NURSING NOTE
"Chief Complaint   Patient presents with     Consult     varicoceles, testicular pain       Blood pressure (!) 130/95, pulse 62, height 1.88 m (6' 2\"), weight 88.9 kg (196 lb). Body mass index is 25.16 kg/m .    There is no problem list on file for this patient.      No Known Allergies    Current Outpatient Medications   Medication Sig Dispense Refill     varenicline (CHANTIX TARYN) 0.5 MG X 11 & 1 MG X 42 tablet Take 0.5 mg tab daily for 3 days, THEN 0.5 mg tab twice daily for 4 days, THEN 1 mg twice daily. 53 tablet 0     Acetaminophen 325 MG CAPS Take 325-650 mg by mouth every 4 hours as needed       aspirin (ASA) 325 MG EC tablet Take 650 mg by mouth every 6 hours as needed for moderate pain       cyclobenzaprine (FLEXERIL) 10 MG tablet Take 0.5-1 tablets (5-10 mg) by mouth 3 times daily as needed for muscle spasms (Patient not taking: Reported on 6/7/2019) 30 tablet 1     HYDROcodone-acetaminophen (NORCO) 5-325 MG per tablet Take 1 tablet by mouth every 4 hours as needed for moderate to severe pain (Patient not taking: Reported on 11/1/2017) 10 tablet 0     naproxen (NAPROSYN) 500 MG tablet Take 1 tablet (500 mg) by mouth 2 times daily as needed for moderate pain (Patient not taking: Reported on 6/7/2019) 30 tablet 1     NO ACTIVE MEDICATIONS .         Social History     Tobacco Use     Smoking status: Current Every Day Smoker     Packs/day: 0.50     Years: 6.00     Pack years: 3.00     Types: Cigarettes     Smokeless tobacco: Never Used   Substance Use Topics     Alcohol use: Yes     Comment: rarely     Drug use: No       Ashwini Gillespie LPN  8/3/2019  7:50 AM  "

## 2019-08-16 ENCOUNTER — PRE VISIT (OUTPATIENT)
Dept: UROLOGY | Facility: CLINIC | Age: 34
End: 2019-08-16

## 2019-08-16 NOTE — TELEPHONE ENCOUNTER
Reason for Visit: Surgery consult, ref Nolvia Conklin CNP    Diagnosis: bilateral varicoceles    Orders/Procedures/Records: in system    Contact Patient: n/a    Rooming Requirements: normal      Ashwini Gillespie LPN  08/16/19  9:33 AM

## 2019-08-17 DIAGNOSIS — F17.200 TOBACCO DEPENDENCE SYNDROME: ICD-10-CM

## 2019-08-19 NOTE — TELEPHONE ENCOUNTER
"Requested Prescriptions   Pending Prescriptions Disp Refills     varenicline (CHANTIX STARTING MONTH PAK) 0.5 MG X 11 & 1 MG X 42 tablet [Pharmacy Med Name: CHANTIX STARTING MONTH PAK 53'S] 53 tablet 0     Sig: USE AS DIRECTED PER PACKAGE INSTRUCTIONS         Last Written Prescription Date:  7/10/19  Last Fill Quantity: 53,   # refills: 0  Last Office Visit: 7/10/19  Future Office visit:       Routing refill request to provider for review/approval because:  BP not at goal, please review new chantix warnings, do you want to order DAILY rx request is for starter pack      Partial Cholinergic Nicotinic Agonist Agents Failed - 8/17/2019  9:39 AM        Failed - Blood pressure under 140/90 in past 12 months     BP Readings from Last 3 Encounters:   08/03/19 (!) 130/95   07/10/19 (!) 132/104   06/07/19 130/80                 Passed - Recent (12 mo) or future (30 days) visit within the authorizing provider's specialty     Patient had office visit in the last 12 months or has a visit in the next 30 days with authorizing provider or within the authorizing provider's specialty.  See \"Patient Info\" tab in inbasket, or \"Choose Columns\" in Meds & Orders section of the refill encounter.              Passed - Medication is active on med list        Passed - Patient is 18 years of age or older          "

## 2019-08-20 RX ORDER — VARENICLINE TARTRATE 1 MG/1
1 TABLET, FILM COATED ORAL 2 TIMES DAILY
Qty: 60 TABLET | Refills: 3 | Status: SHIPPED | OUTPATIENT
Start: 2019-08-20 | End: 2022-03-11

## 2019-08-29 ENCOUNTER — ALLIED HEALTH/NURSE VISIT (OUTPATIENT)
Dept: UROLOGY | Facility: CLINIC | Age: 34
End: 2019-08-29
Payer: COMMERCIAL

## 2019-08-29 ENCOUNTER — OFFICE VISIT (OUTPATIENT)
Dept: UROLOGY | Facility: CLINIC | Age: 34
End: 2019-08-29
Payer: COMMERCIAL

## 2019-08-29 VITALS
BODY MASS INDEX: 26.09 KG/M2 | WEIGHT: 203.3 LBS | HEART RATE: 70 BPM | DIASTOLIC BLOOD PRESSURE: 95 MMHG | SYSTOLIC BLOOD PRESSURE: 128 MMHG | HEIGHT: 74 IN

## 2019-08-29 DIAGNOSIS — I86.1 BILATERAL VARICOCELES: Primary | ICD-10-CM

## 2019-08-29 DIAGNOSIS — N50.819 PAIN OF TESTES: ICD-10-CM

## 2019-08-29 RX ORDER — CEFAZOLIN SODIUM 2 G/50ML
2 SOLUTION INTRAVENOUS
Status: CANCELLED | OUTPATIENT
Start: 2019-08-29

## 2019-08-29 RX ORDER — CEFAZOLIN SODIUM 1 G/50ML
1 INJECTION, SOLUTION INTRAVENOUS SEE ADMIN INSTRUCTIONS
Status: CANCELLED | OUTPATIENT
Start: 2019-08-29

## 2019-08-29 ASSESSMENT — PAIN SCALES - GENERAL: PAINLEVEL: MODERATE PAIN (4)

## 2019-08-29 ASSESSMENT — MIFFLIN-ST. JEOR: SCORE: 1921.91

## 2019-08-29 NOTE — NURSING NOTE
"Chief Complaint   Patient presents with     Consult     Bliateral varicoceles       Blood pressure (!) 128/95, pulse 70, height 1.88 m (6' 2\"), weight 92.2 kg (203 lb 4.8 oz). Body mass index is 26.1 kg/m .    There is no problem list on file for this patient.      No Known Allergies    Current Outpatient Medications   Medication Sig Dispense Refill     Acetaminophen 325 MG CAPS Take 325-650 mg by mouth every 4 hours as needed       aspirin (ASA) 325 MG EC tablet Take 650 mg by mouth every 6 hours as needed for moderate pain       NO ACTIVE MEDICATIONS .       varenicline (CHANTIX STARTING MONTH ) 0.5 MG X 11 & 1 MG X 42 tablet USE AS DIRECTED PER PACKAGE INSTRUCTIONS 53 tablet 0     varenicline (CHANTIX) 1 MG tablet Take 1 tablet (1 mg) by mouth 2 times daily 60 tablet 3     cyclobenzaprine (FLEXERIL) 10 MG tablet Take 0.5-1 tablets (5-10 mg) by mouth 3 times daily as needed for muscle spasms (Patient not taking: Reported on 2019) 30 tablet 1     HYDROcodone-acetaminophen (NORCO) 5-325 MG per tablet Take 1 tablet by mouth every 4 hours as needed for moderate to severe pain (Patient not taking: Reported on 2017) 10 tablet 0     naproxen (NAPROSYN) 500 MG tablet Take 1 tablet (500 mg) by mouth 2 times daily as needed for moderate pain (Patient not taking: Reported on 2019) 30 tablet 1       Social History     Tobacco Use     Smoking status: Former Smoker     Packs/day: 0.50     Years: 6.00     Pack years: 3.00     Types: Cigarettes     Last attempt to quit: 2019     Years since quittin.0     Smokeless tobacco: Never Used   Substance Use Topics     Alcohol use: Yes     Comment: rarely     Drug use: No       Melissa Seals CMA, RMA  2019  9:07 AM     "

## 2019-08-29 NOTE — PROGRESS NOTES
Pre Op Teaching Flowsheet       Pre and Post op Patient Education  Relevant Diagnosis:  Bilateral Varicoceles  Surgical procedure:  Bilateral varicocele repair  Teaching Topic:  Pre and post op teaching  Person Involved in teaching: Wes Epperson    Motivation Level:  Asks Questions: Yes  Eager to Learn:  Yes  Cooperative: Yes  Receptive (willing/able to accept information):  Yes    Patient demonstrates understanding of the following:  Date of surgery:  10/8/19  Location of surgery:  [facility]- 5th Floor  History and Physical and any other testing necessary prior to surgery: Yes  Required time line for completion of History and Physical and any pre-op testing: Yes    Patient demonstrates understanding of the following:  Pre-op bowel prep:  None  Pre-op showering/scrub information with PCMX Soap: Yes  Blood thinner medications discussed and when to stop (if applicable):  Yes      Infection Prevention:   Patient demonstrates understanding of the following:  Surgical procedure site care taught: at time of discharge  Signs and symptoms of infection taught:  Yes      Post-op follow-up:  Discussed how to contact the hospital, nurse, and clinic scheduling staff if necessary.    Instructional materials used/given/mailed:  [facility] Booklet, post op teaching sheet, Map, Soap, and arrival/location information.    Surgical instructions packet given to patient in office:  Yes.    Patient has a  and someone to stay with them for the first 24 hours.

## 2019-08-29 NOTE — LETTER
"8/29/2019       RE: Wes Epperson  [address]  [place] [facility] [address]     Dear Colleague,    Thank you for referring your patient, Wes Epperson, to the [facility] UROLOGY AND [facility]ST FOR PROSTATE AND UROLOGIC CANCERS at [facility]. Please see a copy of my visit note below.    Urology Clinic Note    Name: Wes Epperson    MRN: [id number]   YOB: 1985               Chief Complaint:   Testicular pain and bilateral varicoceles, consult from Dr. Bai      History is obtained from the patient and chart review          History of Present Illness:   Wes Epperson is a 34 year old male with no significant PMH who presents with bilateral testicular pain (L>R) and bilateral varicoceles. He reports sx started about last year. Pain was \"off and on\" and pain was gradually worsening in intensity and frequency. Pain is mainly on left but also on the right. Several months ago he was showering and noted a lump on the left which was the inciting factor for going to an clinic for evaluation. No hx of  trauma. 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     Currently experiencing pain almost every day. Sitting makes the pain worse. Smoking marijuana was helping but has since quit smoking marijuana and cigarettes about 1 month ago. He is a  and this pain is making his work difficult. Denies dysuria, hematuria, urethral discharge, scrotal skin changes, or bothersome LUTS. Also denies fevers, chills, N/V, chest pain, SOB, abdominal aches/pain.          Past Medical History:     Past Medical History:   Diagnosis Date     NO ACTIVE PROBLEMS             Past Surgical History:     Past Surgical History:   Procedure Laterality Date     NO HISTORY OF SURGERY     Tonsillectomy at age of 17    Family hx of going \"to far under\" anesthesia saying his mother experience this in the 1970s with potential decreased heart rate         Social History:     Social History "     Tobacco Use     Smoking status: Former Smoker     Packs/day: 0.50     Years: 6.00     Pack years: 3.00     Types: Cigarettes     Last attempt to quit: 2019     Years since quittin.0     Smokeless tobacco: Never Used   Substance Use Topics     Alcohol use: Yes     Comment: rarely            Family History:   No family history on file.  Breast cancer  Alzheimer's disease         Allergies:   No Known Allergies         Medications:     Current Outpatient Medications   Medication Sig     Acetaminophen 325 MG CAPS Take 325-650 mg by mouth every 4 hours as needed     aspirin (ASA) 325 MG EC tablet Take 650 mg by mouth every 6 hours as needed for moderate pain     NO ACTIVE MEDICATIONS .     varenicline (CHANTIX STARTING MONTH ) 0.5 MG X 11 & 1 MG X 42 tablet USE AS DIRECTED PER PACKAGE INSTRUCTIONS     varenicline (CHANTIX) 1 MG tablet Take 1 tablet (1 mg) by mouth 2 times daily     cyclobenzaprine (FLEXERIL) 10 MG tablet Take 0.5-1 tablets (5-10 mg) by mouth 3 times daily as needed for muscle spasms (Patient not taking: Reported on 2019)     HYDROcodone-acetaminophen (NORCO) 5-325 MG per tablet Take 1 tablet by mouth every 4 hours as needed for moderate to severe pain (Patient not taking: Reported on 2017)     naproxen (NAPROSYN) 500 MG tablet Take 1 tablet (500 mg) by mouth 2 times daily as needed for moderate pain (Patient not taking: Reported on 2019)     No current facility-administered medications for this visit.              Review of Systems:    ROS: 10 point ROS neg other than the symptoms noted above in the HPI           Physical Exam:   VS:  T: Data Unavailable    HR: 70    BP: 128/95    RR: Data Unavailable   GEN:  AOx3.  NAD.    CV:  RRR on peripheral pulse  LUNGS: Non-labored breathing on room air.  ABD:  Soft.  NT.  ND.  No rebound or guarding.  EXT:  Warm, well perfused.  No edema.  SKIN:  Warm.  Dry.  No rashes.  NEURO:  CN grossly intact.  No focal deficits noted.      "EXAM:  Phallus circumcised, meatus adequate, no plaques palpated.   Left testis descended , size is 18 cc , consistency is normal. No intra-testicular masses. Tender to palpation.  Right testis descended , size is 18 cc , consistency is normal. No intra-testicular masses. Nontender.  Epididymes present, non-tender, not enlarged.   Left cord: Vas present. Grade II varicocele noted.  Right cord: Vas present. Grade II varicocele noted.             Data:   All laboratory data reviewed in Epic    UA RESULTS:  Recent Labs   Lab Test 06/07/19  1853   COLOR Yellow   APPEARANCE Clear   URINEGLC Negative   URINEBILI Negative   URINEKETONE Negative   SG 1.020   UBLD Negative   URINEPH 7.0   PROTEIN Negative   UROBILINOGEN 1.0   NITRITE Negative   LEUKEST Negative     All pertinent imaging reviewed:    \"EXAMINATION: US TESTICULAR AND SCROTAL, 6/10/2019 1:53 PM      COMPARISON: None.     HISTORY: Testicular lump     TECHNIQUE: The scrotum was scanned in standard fashion with  specialized ultrasound transducer(s) using both grey scale and limited  color Doppler techniques.     Findings:  The testes demonstrate normal and symmetric echotexture and  vascularity. No evidence of a focal lesion.  The right testicle  measures 4.6 x 2.8 x 3.1 cm and the left measures 4.5 x 2.8 x 2.9 cm.  There is no evidence of torsion.     There is no evidence of a significant hydrocele. Bilateral  varicoceles, left greater than right.     Both the right and left epididymis are within normal limits.                                                                   Impression:   1.  No evidence of a testicular mass.  2.  Bilateral varicoceles, left greater than right.\"          Impression and Plan:   Impression:   Wes Epperson is a 34 year old male with no significant PMH who presents with bilateral testicular pain (L>R) for one year and findings consistent with bilateral varicoceles. We discussed management options, including the risks and " benefits, as well as treatment of just the left side as it is the more painful side. He desires to proceed with surgical treatment of bilateral varicoceles.      Plan:     - Will schedule for bilateral microscopic varicocelectomy       This patient was seen and examined with staff, Dr. Long.    Fco Chen MD  Urology Resident           I saw and examined the patient with the resident today.  I agree with the resident note and plan of care as above.   Discussed risks, benefits, and alternatives of varix repair, including various methods.  I counseled him extensively on the nature of varicoceles, and their possible effects on fertility and pain.  Discussed that pain usually improves after varicocele repair but in rare cases surgery may cause testis sensitivity.  He declines fertility testing at this time.    Mark Long MD  Urology Staff     CC: Dr. RAGINI Bai    Again, thank you for allowing me to participate in the care of your patient.      Sincerely,    Mark Long MD

## 2019-08-29 NOTE — PROGRESS NOTES
"Urology Clinic Note    Name: Wes Epperson    MRN: [id number]   YOB: 1985               Chief Complaint:   Testicular pain and bilateral varicoceles, consult from Dr. Bai      History is obtained from the patient and chart review          History of Present Illness:   Wes Epperson is a 34 year old male with no significant PMH who presents with bilateral testicular pain (L>R) and bilateral varicoceles. He reports sx started about last year. Pain was \"off and on\" and pain was gradually worsening in intensity and frequency. Pain is mainly on left but also on the right. Several months ago he was showering and noted a lump on the left which was the inciting factor for going to an clinic for evaluation. No hx of  trauma. 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     Currently experiencing pain almost every day. Sitting makes the pain worse. Smoking marijuana was helping but has since quit smoking marijuana and cigarettes about 1 month ago. He is a  and this pain is making his work difficult. Denies dysuria, hematuria, urethral discharge, scrotal skin changes, or bothersome LUTS. Also denies fevers, chills, N/V, chest pain, SOB, abdominal aches/pain.          Past Medical History:     Past Medical History:   Diagnosis Date     NO ACTIVE PROBLEMS             Past Surgical History:     Past Surgical History:   Procedure Laterality Date     NO HISTORY OF SURGERY     Tonsillectomy at age of 17    Family hx of going \"to far under\" anesthesia saying his mother experience this in the 1970s with potential decreased heart rate         Social History:     Social History     Tobacco Use     Smoking status: Former Smoker     Packs/day: 0.50     Years: 6.00     Pack years: 3.00     Types: Cigarettes     Last attempt to quit: 2019     Years since quittin.0     Smokeless tobacco: Never Used   Substance Use Topics     Alcohol use: Yes     Comment: rarely            Family History: "   No family history on file.  Breast cancer  Alzheimer's disease         Allergies:   No Known Allergies         Medications:     Current Outpatient Medications   Medication Sig     Acetaminophen 325 MG CAPS Take 325-650 mg by mouth every 4 hours as needed     aspirin (ASA) 325 MG EC tablet Take 650 mg by mouth every 6 hours as needed for moderate pain     NO ACTIVE MEDICATIONS .     varenicline (CHANTIX STARTING MONTH PAK) 0.5 MG X 11 & 1 MG X 42 tablet USE AS DIRECTED PER PACKAGE INSTRUCTIONS     varenicline (CHANTIX) 1 MG tablet Take 1 tablet (1 mg) by mouth 2 times daily     cyclobenzaprine (FLEXERIL) 10 MG tablet Take 0.5-1 tablets (5-10 mg) by mouth 3 times daily as needed for muscle spasms (Patient not taking: Reported on 6/7/2019)     HYDROcodone-acetaminophen (NORCO) 5-325 MG per tablet Take 1 tablet by mouth every 4 hours as needed for moderate to severe pain (Patient not taking: Reported on 11/1/2017)     naproxen (NAPROSYN) 500 MG tablet Take 1 tablet (500 mg) by mouth 2 times daily as needed for moderate pain (Patient not taking: Reported on 6/7/2019)     No current facility-administered medications for this visit.              Review of Systems:    ROS: 10 point ROS neg other than the symptoms noted above in the HPI           Physical Exam:   VS:  T: Data Unavailable    HR: 70    BP: 128/95    RR: Data Unavailable   GEN:  AOx3.  NAD.    CV:  RRR on peripheral pulse  LUNGS: Non-labored breathing on room air.  ABD:  Soft.  NT.  ND.  No rebound or guarding.  EXT:  Warm, well perfused.  No edema.  SKIN:  Warm.  Dry.  No rashes.  NEURO:  CN grossly intact.  No focal deficits noted.     EXAM:  Phallus circumcised, meatus adequate, no plaques palpated.   Left testis descended , size is 18 cc , consistency is normal. No intra-testicular masses. Tender to palpation.  Right testis descended , size is 18 cc , consistency is normal. No intra-testicular masses. Nontender.  Epididymes present, non-tender, not  "enlarged.   Left cord: Vas present. Grade II varicocele noted.  Right cord: Vas present. Grade II varicocele noted.             Data:   All laboratory data reviewed in Epic    UA RESULTS:  Recent Labs   Lab Test 06/07/19  1853   COLOR Yellow   APPEARANCE Clear   URINEGLC Negative   URINEBILI Negative   URINEKETONE Negative   SG 1.020   UBLD Negative   URINEPH 7.0   PROTEIN Negative   UROBILINOGEN 1.0   NITRITE Negative   LEUKEST Negative     All pertinent imaging reviewed:    \"EXAMINATION: US TESTICULAR AND SCROTAL, 6/10/2019 1:53 PM      COMPARISON: None.     HISTORY: Testicular lump     TECHNIQUE: The scrotum was scanned in standard fashion with  specialized ultrasound transducer(s) using both grey scale and limited  color Doppler techniques.     Findings:  The testes demonstrate normal and symmetric echotexture and  vascularity. No evidence of a focal lesion.  The right testicle  measures 4.6 x 2.8 x 3.1 cm and the left measures 4.5 x 2.8 x 2.9 cm.  There is no evidence of torsion.     There is no evidence of a significant hydrocele. Bilateral  varicoceles, left greater than right.     Both the right and left epididymis are within normal limits.                                                                   Impression:   1.  No evidence of a testicular mass.  2.  Bilateral varicoceles, left greater than right.\"          Impression and Plan:   Impression:   Wes Epperson is a 34 year old male with no significant PMH who presents with bilateral testicular pain (L>R) for one year and findings consistent with bilateral varicoceles. We discussed management options, including the risks and benefits, as well as treatment of just the left side as it is the more painful side. He desires to proceed with surgical treatment of bilateral varicoceles.      Plan:     - Will schedule for bilateral microscopic varicocelectomy       This patient was seen and examined with staff, Dr. Long.    Fco Chen MD  Urology " Resident           I saw and examined the patient with the resident today.  I agree with the resident note and plan of care as above.   Discussed risks, benefits, and alternatives of varix repair, including various methods.  I counseled him extensively on the nature of varicoceles, and their possible effects on fertility and pain.  Discussed that pain usually improves after varicocele repair but in rare cases surgery may cause testis sensitivity.  He declines fertility testing at this time.    Mark Long MD  Urology Staff     CC: Dr. RAGINI Bai

## 2019-09-24 ENCOUNTER — ANCILLARY PROCEDURE (OUTPATIENT)
Dept: GENERAL RADIOLOGY | Facility: CLINIC | Age: 34
End: 2019-09-24
Attending: FAMILY MEDICINE
Payer: COMMERCIAL

## 2019-09-24 ENCOUNTER — OFFICE VISIT (OUTPATIENT)
Dept: FAMILY MEDICINE | Facility: CLINIC | Age: 34
End: 2019-09-24
Payer: COMMERCIAL

## 2019-09-24 VITALS
WEIGHT: 217.5 LBS | SYSTOLIC BLOOD PRESSURE: 150 MMHG | BODY MASS INDEX: 27.93 KG/M2 | TEMPERATURE: 97.7 F | DIASTOLIC BLOOD PRESSURE: 110 MMHG | OXYGEN SATURATION: 100 % | HEART RATE: 73 BPM

## 2019-09-24 DIAGNOSIS — R07.89 ATYPICAL CHEST PAIN: ICD-10-CM

## 2019-09-24 DIAGNOSIS — R05.9 COUGH: ICD-10-CM

## 2019-09-24 DIAGNOSIS — R03.0 ELEVATED BLOOD PRESSURE READING WITHOUT DIAGNOSIS OF HYPERTENSION: ICD-10-CM

## 2019-09-24 DIAGNOSIS — H92.02 LEFT EAR PAIN: ICD-10-CM

## 2019-09-24 DIAGNOSIS — Z01.818 PREOP GENERAL PHYSICAL EXAM: Primary | ICD-10-CM

## 2019-09-24 PROCEDURE — 71046 X-RAY EXAM CHEST 2 VIEWS: CPT

## 2019-09-24 PROCEDURE — 93000 ELECTROCARDIOGRAM COMPLETE: CPT | Performed by: FAMILY MEDICINE

## 2019-09-24 PROCEDURE — 99214 OFFICE O/P EST MOD 30 MIN: CPT | Performed by: FAMILY MEDICINE

## 2019-09-24 NOTE — PROGRESS NOTES
[facility]  [address] [place] [id number] [phone]  Dept: [phone]    PRE-OP EVALUATION:  Today's date: 2019    Wes Epperson (: 1985) presents for pre-operative evaluation assessment as requested by Dr. briggs.  He requires evaluation and anesthesia risk assessment prior to undergoing surgery/procedure for treatment of varicole  repair  .    Fax number for surgical facility: [facility]   Primary Physician: Bijan Pecka  Type of Anesthesia Anticipated: General    Patient has a Health Care Directive or Living Will:  NO    Preop Questions 2019   Who is doing your surgery? Ethan   What are you having done? varicle repair   Date of Surgery/Procedure: oct 8 2019   Facility or Hospital where procedure/surgery will be performed: [facility]   1.  Do you have a history of Heart attack, stroke, stent, coronary bypass surgery, or other heart surgery? No   2.  Do you ever have any pain or discomfort in your chest? YES - Around one year when he is anxious he'll get sharp left pain which lasts for a few minutes and then gets better. Last week he had symptoms. Pain is mild, non radiating and no relieving factors. He takes deep breaths. No nausea, vomiting or sweating. Last time he had the pain he was irritable. Sometimes discomfort occurs at rest.    3.  Do you have a history of  Heart Failure? No   4.   Are you troubled by shortness of breath when:  walking on a level surface, or up a slight hill, or at night? No   5.  Do you currently have a cold, bronchitis or other respiratory infection? no   6.  Do you have a cough, shortness of breath, or wheezing? No   7.  Do you sometimes get pains in the calves of your legs when you walk? No   8. Do you or anyone in your family have previous history of blood clots? No   9.  Do you or does anyone in your family have a serious bleeding problem such as prolonged  bleeding following surgeries or cuts? No   10. Have you ever had problems with anemia or been told to take iron pills? No   11. Have you had any abnormal blood loss such as black, tarry or bloody stools? No   12. Have you ever had a blood transfusion? No   13. Have you or any of your relatives ever had problems with anesthesia? YES - Mom but unsure    14. Do you have sleep apnea, excessive snoring or daytime drowsiness? YES - due to chantix he gets drowsy. This started since taking chantix   15. Do you have any prosthetic heart valves? No   16. Do you have prosthetic joints? No       HPI:     HPI related to upcoming procedure bilateral varicocele - He has daily pain in both testicles. No rashes or urinary symptoms.     Left ear pain - He has had pain for around 1.5 weeks. He also has a static noise. He had mild rhinorrhea. He also endorses dry cough for around one month. No discharge, fever, chills, nasal congestion, headache, sinus pressure or shortness of breath.     Ex-smoker- Currently using chantix      See problem list for active medical problems.  Problems all longstanding and stable, except as noted/documented.  See ROS for pertinent symptoms related to these conditions.      MEDICAL HISTORY:   There are no active problems to display for this patient.     Past Medical History:   Diagnosis Date     NO ACTIVE PROBLEMS      Past Surgical History:   Procedure Laterality Date     NO HISTORY OF SURGERY       Current Outpatient Medications   Medication Sig Dispense Refill     Acetaminophen 325 MG CAPS Take 325-650 mg by mouth every 4 hours as needed       varenicline (CHANTIX STARTING MONTH PAK) 0.5 MG X 11 & 1 MG X 42 tablet USE AS DIRECTED PER PACKAGE INSTRUCTIONS 53 tablet 0     varenicline (CHANTIX) 1 MG tablet Take 1 tablet (1 mg) by mouth 2 times daily 60 tablet 3     OTC products: None, except as noted above    No Known Allergies   Latex Allergy: NO    Social History     Tobacco Use     Smoking status:  Former Smoker     Packs/day: 0.50     Years: 6.00     Pack years: 3.00     Types: Cigarettes     Last attempt to quit: 2019     Years since quittin.1     Smokeless tobacco: Never Used   Substance Use Topics     Alcohol use: Yes     Comment: rarely     History   Drug Use No       REVIEW OF SYSTEMS:   CONSTITUTIONAL: NEGATIVE for fever, chills, change in weight  INTEGUMENTARY/SKIN: NEGATIVE for worrisome rashes, moles or lesions  EYES: NEGATIVE for vision changes or irritation  ENT/MOUTH: see above   RESP: NEGATIVE for significant cough or SOB  BREAST: NEGATIVE for masses, tenderness or discharge  CV: NEGATIVE for chest pain, palpitations or peripheral edema  GI: NEGATIVE for nausea, abdominal pain, heartburn, or change in bowel habits  : see above   MUSCULOSKELETAL: NEGATIVE for significant arthralgias or myalgia  NEURO: NEGATIVE for weakness, dizziness or paresthesias  ENDOCRINE: NEGATIVE for temperature intolerance, skin/hair changes  HEME: NEGATIVE for bleeding problems  PSYCHIATRIC: NEGATIVE for changes in mood or affect    EXAM:   BP (!) 130/90   Pulse 73   Temp 97.7  F (36.5  C) (Oral)   Wt 98.7 kg (217 lb 8 oz)   SpO2 100%   BMI 27.93 kg/m     Repeat 122/96     GENERAL APPEARANCE: healthy, alert and no distress     EYES: EOMI,  PERRL     HENT: ear canals and TM's normal and nose and mouth without ulcers or lesions     NECK: no adenopathy, no asymmetry, masses, or scars and thyroid normal to palpation     RESP: lungs clear to auscultation - no rales, rhonchi or wheezes     CV: regular rates and rhythm, normal S1 S2, no S3 or S4 and no murmur, click or rub     ABDOMEN:  soft, nontender, no HSM or masses and bowel sounds normal     MS: extremities normal- no gross deformities noted, no evidence of inflammation in joints, FROM in all extremities.     SKIN: no suspicious lesions or rashes     NEURO: Normal strength and tone, sensory exam grossly normal, mentation intact and speech normal     PSYCH:  mentation appears normal. and affect normal/bright     LYMPHATICS: No cervical adenopathy    DIAGNOSTICS:   EKG - NSR, no acute changes  CXR - no acute cardiopulmonary disease     IMPRESSION:   Reason for surgery/procedure: bilateral varicocele   Diagnosis/reason for consult: pre-op     The proposed surgical procedure is considered INTERMEDIATE risk.    REVISED CARDIAC RISK INDEX  The patient has the following serious cardiovascular risks for perioperative complications such as (MI, PE, VFib and 3  AV Block):  No serious cardiac risks  INTERPRETATION: 0 risks: Class I (very low risk - 0.4% complication rate)    The patient has the following additional risks for perioperative complications:  No identified additional risks      ICD-10-CM    1. Preop general physical exam Z01.818      2. Cough    3. Atypical chest pain    4. Elevated blood pressure reading without diagnosis of hypertension  5. Left ear pain   RECOMMENDATIONS:     --Consult hospital rounder / IM to assist post-op medical management    --Patient is to hold all scheduled medications on the day of surgery EXCEPT for modifications listed below.    Cough  - Ordered CXR for further evaluation - per my view was normal   - XR Chest 2 Views; Future  - advised symptomatic tx     Atypical chest pain  - Could be related 2/2 anxiety  - EKG was obtained which showed no acute changes  - Continue to monitor     Left ear pain   - Advised symptomatic tx including Flonase Qday     Elevated blood pressure reading without diagnosis of hypertension  Improved when B/P was rechecked on 09/30/19   Advised low salt diet and increased activity   MA blood pressure check in 6 to 8 weeks     No Advil or Aleve 3 days before surgery.  No Aspirin 7 days before surgery.      APPROVAL GIVEN to proceed with proposed procedure, without further diagnostic evaluation       Signed Electronically by: Kris Fitch MD    Copy of this evaluation report is provided to requesting  physician.    [place] Preop Guidelines    Revised Cardiac Risk Index

## 2019-09-24 NOTE — PATIENT INSTRUCTIONS
Low salt and increased activity   Recheck B/P next Monday (09/30/19) at 9:30 am       You can try Flonase 2 sprays in each nostril once daily     No Advil or Aleve 3 days before surgery.  No Aspirin 7 days before surgery.  Hold all medications on the day of the surgery.     Before Your Surgery      Call your surgeon if there is any change in your health. This includes signs of a cold or flu (such as a sore throat, runny nose, cough, rash or fever).    Do not smoke, drink alcohol or take over the counter medicine (unless your surgeon or primary care doctor tells you to) for the 24 hours before and after surgery.    If you take prescribed drugs: Follow your doctor s orders about which medicines to take and which to stop until after surgery.    Eating and drinking prior to surgery: follow the instructions from your surgeon    Take a shower or bath the night before surgery. Use the soap your surgeon gave you to gently clean your skin. If you do not have soap from your surgeon, use your regular soap. Do not shave or scrub the surgery site.  Wear clean pajamas and have clean sheets on your bed.

## 2019-09-30 ENCOUNTER — ALLIED HEALTH/NURSE VISIT (OUTPATIENT)
Dept: NURSING | Facility: CLINIC | Age: 34
End: 2019-09-30
Payer: COMMERCIAL

## 2019-09-30 VITALS — HEART RATE: 76 BPM | DIASTOLIC BLOOD PRESSURE: 96 MMHG | OXYGEN SATURATION: 99 % | SYSTOLIC BLOOD PRESSURE: 122 MMHG

## 2019-09-30 DIAGNOSIS — Z01.30 BP CHECK: Primary | ICD-10-CM

## 2019-09-30 PROCEDURE — 99207 ZZC NO CHARGE NURSE ONLY: CPT

## 2019-09-30 NOTE — NURSING NOTE
Wes Epperson is a 34 year old patient who comes in today for a Blood Pressure check.  Initial BP:  BP (!) 122/96 (BP Location: Right arm, Patient Position: Sitting, Cuff Size: Adult Large)   Pulse 76   SpO2 99%      76  Disposition: provider notified while patient in the clinic and results routed to provider    Kaitlyn Valenzuela CMA

## 2019-10-04 SDOH — HEALTH STABILITY: MENTAL HEALTH: HOW OFTEN DO YOU HAVE A DRINK CONTAINING ALCOHOL?: NEVER

## 2019-10-07 ENCOUNTER — ANESTHESIA EVENT (OUTPATIENT)
Dept: SURGERY | Facility: AMBULATORY SURGERY CENTER | Age: 34
End: 2019-10-07

## 2019-10-07 NOTE — ANESTHESIA PREPROCEDURE EVALUATION
"Anesthesia Pre-Procedure Evaluation    Patient: Wes Epperson   MRN:     [id number] Gender:   male   Age:    34 year old :      1985        Preoperative Diagnosis: Bilateral Varicoceles   Procedure(s):  Bilateral Varicocele Repair     Past Medical History:   Diagnosis Date     NO ACTIVE PROBLEMS       Past Surgical History:   Procedure Laterality Date     NO HISTORY OF SURGERY                     PHYSICAL EXAM:   Mental Status/Neuro: A/A/O   Airway: Facies: Feasible (thick herrera)  Mallampati: I  Mouth/Opening: Full  TM distance: > 6 cm  Neck ROM: Full   Respiratory: Auscultation: CTAB     Resp. Rate: Normal     Resp. Effort: Normal      CV: Rhythm: Regular  Rate: Age appropriate  Heart: Normal Sounds  Edema: None   Comments:                      LABS:  CBC: No results found for: WBC, HGB, HCT, PLT  BMP: No results found for: NA, POTASSIUM, CHLORIDE, CO2, BUN, CR, GLC  COAGS: No results found for: PTT, INR, FIBR  POC: No results found for: BGM, HCG, HCGS  OTHER: No results found for: PH, LACT, A1C, DESTIN, PHOS, MAG, ALBUMIN, PROTTOTAL, ALT, AST, GGT, ALKPHOS, BILITOTAL, BILIDIRECT, LIPASE, AMYLASE, ARIS, TSH, T4, T3, CRP, SED     Preop Vitals    BP Readings from Last 3 Encounters:   19 (!) 122/96   19 (!) 150/110   19 (!) 128/95    Pulse Readings from Last 3 Encounters:   19 76   19 73   19 70      Resp Readings from Last 3 Encounters:   07/10/19 16   17 16   08/02/15 18    SpO2 Readings from Last 3 Encounters:   19 99%   19 100%   07/10/19 95%      Temp Readings from Last 1 Encounters:   19 36.5  C (97.7  F) (Oral)    Ht Readings from Last 1 Encounters:   19 1.88 m (6' 2\")      Wt Readings from Last 1 Encounters:   19 98.7 kg (217 lb 8 oz)    Estimated body mass index is 27.93 kg/m  as calculated from the following:    Height as of 19: 1.88 m (6' 2\").    Weight as of 19: 98.7 kg (217 lb 8 oz).     LDA:        Assessment: "   ASA SCORE: 1    H&P: History and physical reviewed and following examination; no interval change.    NPO Status: NPO Appropriate     Plan:   Anes. Type:  General   Pre-Medication: None   Induction:  IV (Standard)   Airway: LMA   Access/Monitoring: PIV   Maintenance: Balanced     Postop Plan:   Postop Pain: Opioids  Postop Sedation/Airway: Not planned  Disposition: Outpatient     PONV Management:   Adult Risk Factors:, Postop Opioids   Prevention: Ondansetron, Dexamethasone     CONSENT: Direct conversation   Plan and risks discussed with: Patient          Comments for Plan/Consent:  Veronica Garland MD     ANESTHESIA PREOP EVALUATION    PROCEDURE: Procedure(s):  Bilateral Varicocele Repair    HPI: Wes Epperson is a 34 year old male who presents for above procedure 2/2 varioceles.    PAST MEDICAL HISTORY:    Past Medical History:   Diagnosis Date     NO ACTIVE PROBLEMS        PAST SURGICAL HISTORY:    Past Surgical History:   Procedure Laterality Date     NO HISTORY OF SURGERY         SOCIAL HISTORY:       Social History     Tobacco Use     Smoking status: Former Smoker     Packs/day: 0.50     Years: 6.00     Pack years: 3.00     Types: Cigarettes     Last attempt to quit: 2019     Years since quittin.1     Smokeless tobacco: Never Used   Substance Use Topics     Alcohol use: Never     Frequency: Never       ALLERGIES:     No Known Allergies    MEDICATIONS:     (Not in a hospital admission)      Current Outpatient Medications   Medication Sig Dispense Refill     Acetaminophen 325 MG CAPS Take 325-650 mg by mouth every 4 hours as needed       varenicline (CHANTIX STARTING MONTH ) 0.5 MG X 11 & 1 MG X 42 tablet USE AS DIRECTED PER PACKAGE INSTRUCTIONS 53 tablet 0     varenicline (CHANTIX) 1 MG tablet Take 1 tablet (1 mg) by mouth 2 times daily 60 tablet 3       Current Outpatient Medications Ordered in Epic   Medication Sig Dispense Refill     Acetaminophen 325 MG CAPS Take 325-650 mg  by mouth every 4 hours as needed       varenicline (CHANTIX STARTING MONTH PAK) 0.5 MG X 11 & 1 MG X 42 tablet USE AS DIRECTED PER PACKAGE INSTRUCTIONS 53 tablet 0     varenicline (CHANTIX) 1 MG tablet Take 1 tablet (1 mg) by mouth 2 times daily 60 tablet 3     No current Epic-ordered facility-administered medications on file.        PHYSICAL EXAM:    Vitals: T Data Unavailable, P Data Unavailable, BP Data Unavailable, R Data Unavailable, SpO2  , Weight   Wt Readings from Last 2 Encounters:   09/24/19 98.7 kg (217 lb 8 oz)   08/29/19 92.2 kg (203 lb 4.8 oz)       See doc flowsheet    NPO STATUS: see doc flowsheet    LABS:    BMP:  No results for input(s): NA, POTASSIUM, CHLORIDE, CO2, BUN, CR, GLC, DESTIN in the last 81942 hours.    LFTs:   No results for input(s): PROTTOTAL, ALBUMIN, BILITOTAL, ALKPHOS, AST, ALT, BILIDIRECT in the last 29885 hours.    CBC:   No results for input(s): WBC, RBC, HGB, HCT, MCV, MCH, MCHC, RDW, PLT in the last 12894 hours.    Coags:  No results for input(s): INR, PTT, FIBR in the last 54619 hours.    Imaging:  No orders to display       Milton Garland MD  Anesthesiology Staff  Pager [phone]    10/7/2019  1:39 PM

## 2019-10-08 ENCOUNTER — HOSPITAL ENCOUNTER (OUTPATIENT)
Facility: AMBULATORY SURGERY CENTER | Age: 34
End: 2019-10-08
Attending: UROLOGY
Payer: COMMERCIAL

## 2019-10-08 ENCOUNTER — ANESTHESIA (OUTPATIENT)
Dept: SURGERY | Facility: AMBULATORY SURGERY CENTER | Age: 34
End: 2019-10-08

## 2019-10-08 VITALS
DIASTOLIC BLOOD PRESSURE: 73 MMHG | RESPIRATION RATE: 12 BRPM | HEART RATE: 93 BPM | OXYGEN SATURATION: 95 % | TEMPERATURE: 97.6 F | SYSTOLIC BLOOD PRESSURE: 119 MMHG

## 2019-10-08 DIAGNOSIS — I86.1 VARICOCELE: Primary | ICD-10-CM

## 2019-10-08 RX ORDER — DEXAMETHASONE SODIUM PHOSPHATE 4 MG/ML
INJECTION, SOLUTION INTRA-ARTICULAR; INTRALESIONAL; INTRAMUSCULAR; INTRAVENOUS; SOFT TISSUE PRN
Status: DISCONTINUED | OUTPATIENT
Start: 2019-10-08 | End: 2019-10-08

## 2019-10-08 RX ORDER — ONDANSETRON 4 MG/1
4 TABLET, ORALLY DISINTEGRATING ORAL EVERY 30 MIN PRN
Status: DISCONTINUED | OUTPATIENT
Start: 2019-10-08 | End: 2019-10-09 | Stop reason: HOSPADM

## 2019-10-08 RX ORDER — SODIUM CHLORIDE, SODIUM LACTATE, POTASSIUM CHLORIDE, CALCIUM CHLORIDE 600; 310; 30; 20 MG/100ML; MG/100ML; MG/100ML; MG/100ML
INJECTION, SOLUTION INTRAVENOUS CONTINUOUS
Status: DISCONTINUED | OUTPATIENT
Start: 2019-10-08 | End: 2019-10-08 | Stop reason: HOSPADM

## 2019-10-08 RX ORDER — PROPOFOL 10 MG/ML
INJECTION, EMULSION INTRAVENOUS CONTINUOUS PRN
Status: DISCONTINUED | OUTPATIENT
Start: 2019-10-08 | End: 2019-10-08

## 2019-10-08 RX ORDER — OXYCODONE HYDROCHLORIDE 5 MG/1
5 TABLET ORAL EVERY 4 HOURS PRN
Status: DISCONTINUED | OUTPATIENT
Start: 2019-10-08 | End: 2019-10-09 | Stop reason: HOSPADM

## 2019-10-08 RX ORDER — CEFAZOLIN SODIUM 1 G/3ML
INJECTION, POWDER, FOR SOLUTION INTRAMUSCULAR; INTRAVENOUS PRN
Status: DISCONTINUED | OUTPATIENT
Start: 2019-10-08 | End: 2019-10-08

## 2019-10-08 RX ORDER — SODIUM CHLORIDE, SODIUM LACTATE, POTASSIUM CHLORIDE, CALCIUM CHLORIDE 600; 310; 30; 20 MG/100ML; MG/100ML; MG/100ML; MG/100ML
INJECTION, SOLUTION INTRAVENOUS CONTINUOUS
Status: DISCONTINUED | OUTPATIENT
Start: 2019-10-08 | End: 2019-10-09 | Stop reason: HOSPADM

## 2019-10-08 RX ORDER — NALOXONE HYDROCHLORIDE 0.4 MG/ML
.1-.4 INJECTION, SOLUTION INTRAMUSCULAR; INTRAVENOUS; SUBCUTANEOUS
Status: DISCONTINUED | OUTPATIENT
Start: 2019-10-08 | End: 2019-10-09 | Stop reason: HOSPADM

## 2019-10-08 RX ORDER — PAPAVERINE HYDROCHLORIDE 30 MG/ML
INJECTION INTRAMUSCULAR; INTRAVENOUS PRN
Status: DISCONTINUED | OUTPATIENT
Start: 2019-10-08 | End: 2019-10-08 | Stop reason: HOSPADM

## 2019-10-08 RX ORDER — ONDANSETRON 2 MG/ML
4 INJECTION INTRAMUSCULAR; INTRAVENOUS EVERY 30 MIN PRN
Status: DISCONTINUED | OUTPATIENT
Start: 2019-10-08 | End: 2019-10-09 | Stop reason: HOSPADM

## 2019-10-08 RX ORDER — HYDROMORPHONE HYDROCHLORIDE 1 MG/ML
.3-.5 INJECTION, SOLUTION INTRAMUSCULAR; INTRAVENOUS; SUBCUTANEOUS EVERY 10 MIN PRN
Status: DISCONTINUED | OUTPATIENT
Start: 2019-10-08 | End: 2019-10-09 | Stop reason: HOSPADM

## 2019-10-08 RX ORDER — MEPERIDINE HYDROCHLORIDE 25 MG/ML
12.5 INJECTION INTRAMUSCULAR; INTRAVENOUS; SUBCUTANEOUS
Status: DISCONTINUED | OUTPATIENT
Start: 2019-10-08 | End: 2019-10-09 | Stop reason: HOSPADM

## 2019-10-08 RX ORDER — EPHEDRINE SULFATE 50 MG/ML
INJECTION, SOLUTION INTRAMUSCULAR; INTRAVENOUS; SUBCUTANEOUS PRN
Status: DISCONTINUED | OUTPATIENT
Start: 2019-10-08 | End: 2019-10-08

## 2019-10-08 RX ORDER — LIDOCAINE HYDROCHLORIDE 20 MG/ML
INJECTION, SOLUTION INFILTRATION; PERINEURAL PRN
Status: DISCONTINUED | OUTPATIENT
Start: 2019-10-08 | End: 2019-10-08

## 2019-10-08 RX ORDER — ONDANSETRON 2 MG/ML
INJECTION INTRAMUSCULAR; INTRAVENOUS PRN
Status: DISCONTINUED | OUTPATIENT
Start: 2019-10-08 | End: 2019-10-08

## 2019-10-08 RX ORDER — PROPOFOL 10 MG/ML
INJECTION, EMULSION INTRAVENOUS PRN
Status: DISCONTINUED | OUTPATIENT
Start: 2019-10-08 | End: 2019-10-08

## 2019-10-08 RX ORDER — CEFAZOLIN SODIUM 1 G/50ML
1 SOLUTION INTRAVENOUS SEE ADMIN INSTRUCTIONS
Status: DISCONTINUED | OUTPATIENT
Start: 2019-10-08 | End: 2019-10-08 | Stop reason: HOSPADM

## 2019-10-08 RX ORDER — FENTANYL CITRATE 50 UG/ML
INJECTION, SOLUTION INTRAMUSCULAR; INTRAVENOUS PRN
Status: DISCONTINUED | OUTPATIENT
Start: 2019-10-08 | End: 2019-10-08

## 2019-10-08 RX ORDER — FENTANYL CITRATE 50 UG/ML
25-50 INJECTION, SOLUTION INTRAMUSCULAR; INTRAVENOUS
Status: DISCONTINUED | OUTPATIENT
Start: 2019-10-08 | End: 2019-10-08 | Stop reason: HOSPADM

## 2019-10-08 RX ORDER — BUPIVACAINE HYDROCHLORIDE 5 MG/ML
INJECTION, SOLUTION PERINEURAL PRN
Status: DISCONTINUED | OUTPATIENT
Start: 2019-10-08 | End: 2019-10-08 | Stop reason: HOSPADM

## 2019-10-08 RX ORDER — SODIUM CHLORIDE, SODIUM LACTATE, POTASSIUM CHLORIDE, CALCIUM CHLORIDE 600; 310; 30; 20 MG/100ML; MG/100ML; MG/100ML; MG/100ML
INJECTION, SOLUTION INTRAVENOUS CONTINUOUS PRN
Status: DISCONTINUED | OUTPATIENT
Start: 2019-10-08 | End: 2019-10-08

## 2019-10-08 RX ORDER — OXYCODONE HYDROCHLORIDE 5 MG/1
5-10 TABLET ORAL EVERY 6 HOURS PRN
Qty: 20 TABLET | Refills: 0 | Status: SHIPPED | OUTPATIENT
Start: 2019-10-08 | End: 2022-03-11

## 2019-10-08 RX ORDER — GABAPENTIN 300 MG/1
300 CAPSULE ORAL ONCE
Status: COMPLETED | OUTPATIENT
Start: 2019-10-08 | End: 2019-10-08

## 2019-10-08 RX ORDER — CEFAZOLIN SODIUM 2 G/50ML
2 SOLUTION INTRAVENOUS
Status: DISCONTINUED | OUTPATIENT
Start: 2019-10-08 | End: 2019-10-08 | Stop reason: HOSPADM

## 2019-10-08 RX ORDER — LIDOCAINE 40 MG/G
CREAM TOPICAL
Status: DISCONTINUED | OUTPATIENT
Start: 2019-10-08 | End: 2019-10-08 | Stop reason: HOSPADM

## 2019-10-08 RX ORDER — ACETAMINOPHEN 325 MG/1
650 TABLET ORAL EVERY 4 HOURS PRN
Qty: 50 TABLET | Refills: 0 | Status: SHIPPED | OUTPATIENT
Start: 2019-10-08 | End: 2022-03-11

## 2019-10-08 RX ORDER — ACETAMINOPHEN 325 MG/1
975 TABLET ORAL ONCE
Status: COMPLETED | OUTPATIENT
Start: 2019-10-08 | End: 2019-10-08

## 2019-10-08 RX ADMIN — PROPOFOL 200 MCG/KG/MIN: 10 INJECTION, EMULSION INTRAVENOUS at 08:07

## 2019-10-08 RX ADMIN — EPHEDRINE SULFATE 7.5 MG: 50 INJECTION, SOLUTION INTRAMUSCULAR; INTRAVENOUS; SUBCUTANEOUS at 08:50

## 2019-10-08 RX ADMIN — FENTANYL CITRATE 25 MCG: 50 INJECTION, SOLUTION INTRAMUSCULAR; INTRAVENOUS at 08:13

## 2019-10-08 RX ADMIN — PROPOFOL 100 MG: 10 INJECTION, EMULSION INTRAVENOUS at 08:04

## 2019-10-08 RX ADMIN — ONDANSETRON 4 MG: 2 INJECTION INTRAMUSCULAR; INTRAVENOUS at 11:55

## 2019-10-08 RX ADMIN — ONDANSETRON 4 MG: 2 INJECTION INTRAMUSCULAR; INTRAVENOUS at 08:12

## 2019-10-08 RX ADMIN — CEFAZOLIN SODIUM 1 G: 1 INJECTION, POWDER, FOR SOLUTION INTRAMUSCULAR; INTRAVENOUS at 10:10

## 2019-10-08 RX ADMIN — SODIUM CHLORIDE, SODIUM LACTATE, POTASSIUM CHLORIDE, CALCIUM CHLORIDE: 600; 310; 30; 20 INJECTION, SOLUTION INTRAVENOUS at 07:12

## 2019-10-08 RX ADMIN — Medication 0.5 MG: at 10:13

## 2019-10-08 RX ADMIN — LIDOCAINE HYDROCHLORIDE 100 MG: 20 INJECTION, SOLUTION INFILTRATION; PERINEURAL at 08:03

## 2019-10-08 RX ADMIN — ACETAMINOPHEN 975 MG: 325 TABLET ORAL at 07:11

## 2019-10-08 RX ADMIN — PROPOFOL 20 MG: 10 INJECTION, EMULSION INTRAVENOUS at 11:28

## 2019-10-08 RX ADMIN — CEFAZOLIN SODIUM 2 G: 1 INJECTION, POWDER, FOR SOLUTION INTRAMUSCULAR; INTRAVENOUS at 08:10

## 2019-10-08 RX ADMIN — FENTANYL CITRATE 75 MCG: 50 INJECTION, SOLUTION INTRAMUSCULAR; INTRAVENOUS at 08:05

## 2019-10-08 RX ADMIN — PROPOFOL 50 MG: 10 INJECTION, EMULSION INTRAVENOUS at 10:45

## 2019-10-08 RX ADMIN — SODIUM CHLORIDE, SODIUM LACTATE, POTASSIUM CHLORIDE, CALCIUM CHLORIDE: 600; 310; 30; 20 INJECTION, SOLUTION INTRAVENOUS at 11:43

## 2019-10-08 RX ADMIN — EPHEDRINE SULFATE 7.5 MG: 50 INJECTION, SOLUTION INTRAMUSCULAR; INTRAVENOUS; SUBCUTANEOUS at 09:13

## 2019-10-08 RX ADMIN — DEXAMETHASONE SODIUM PHOSPHATE 4 MG: 4 INJECTION, SOLUTION INTRA-ARTICULAR; INTRALESIONAL; INTRAMUSCULAR; INTRAVENOUS; SOFT TISSUE at 08:12

## 2019-10-08 RX ADMIN — PROPOFOL 300 MG: 10 INJECTION, EMULSION INTRAVENOUS at 08:03

## 2019-10-08 RX ADMIN — GABAPENTIN 300 MG: 300 CAPSULE ORAL at 07:12

## 2019-10-08 NOTE — OP NOTE
OPERATIVE REPORT    PREOPERATIVE DIAGNOSIS: Bilateral varicoceles.  POSTOPERATIVE DIAGNOSIS: Same as above    PROCEDURE PERFORMED: Bilateral varicocelectomy using microscope.     STAFF SURGEON: Mark Long MD    RESIDENT SURGEON:  Hossein Burr MD    ANESTHESIA: General endotracheal.     ESTIMATED BLOOD LOSS: 1 mL.    SPECIMENS: None.     COMPLICATIONS: None apparent     SIGNIFICANT FINDINGS:   Left side: Spared 2 arteries, 7 lymphatic channels  Right side: Spared 4 arteries, 5 lymphatic channels  Vasal packet bilaterally.    INDICATIONS FOR PROCEDURE: Wes Epperson is a(n) 34 year old gentleman who presented for evaluation of chronic scrotal pain. He was found to have an abnormal semen analysis and bilateral varicoceles on exam. and has elected for repair.  The risks, benefits, alternatives were discussed with the patient and he was consented on an elective basis to have this done.     DESCRIPTION OF PROCEDURE: After obtaining informed consent, properly marking and identifying the patient in the preoperative area, he was brought to the operating room, placed on the operating room table in the supine position. General anesthesia was obtained. Pneumoboots and preoperative antibiotics were administered. The patient was subsequently shaved, prepped and draped in the standard sterile fashion. A timeout was performed verifying the patient and procedure prior to beginning.   We began the procedure started by marking 3 cm subinguinal incision on the right side below the external ring. Injection of 0.5% Marcaine was used for local anesthesia before incision.  Electrocautery was used to carry dissection down to troy's fascia which was subsequently opened bluntly using the Metzenbaum scissors exposing the cord. Blunt dissection was continued proximally and distally to free up the cord and bring it up to the incision. The prepubic area and tissue surrounding the spermatic cord were examined carefully looking for any  additional vessels or veins. None were found and an [facility] navy retractor was used to hold the cord out of the incision for the remainder of the procedure. The same procedure was carried out on the left side.  The Microscope was then brought into the field.    Spermatic fascia and cremasteric muscle fibers were opened using blunt dissection and electrocautery in the direction of the cord. Bundles of the cremasteric fibers were isolated laterally and medially with vessel loops and were pulled on the respective directions to aid in retraction of the cord and expose the vascular elements. Next, the vas deferens and its associated vessels and lymphatics were identified and preserved and again marked using a vessel loop. We then used a Doppler, 20 Hz probe to identify areas of arterial flow. Within the cord structures themselves, we were able to identify and preserve 2 arteries.  We also successfully identified 7 lymphatic channels that were preserved.  The remaining veins within the cord were ligated using 2-0 and 4-0 silk ties. The cremasteric muscle fibers were subsequently also ligated and divided using 2-0 silk ties. Of note, one section of cremasteric muscle fibers was doubly ligated but not ultimately divided in order to support the testicle. Following this, hemostasis was obtained using bipolar electrocautery and excellent hemostasis was noted. The cord was injected with 0.5% Marcain placed back into the left prepubic area. At this point a reynolds catheter was placed.  The procedure as stated above was then repeated on the right side. Within the cord structures themselves, we were able to identify and preserve 4 arteries.  We also successfully identified 5 lymphatic channels that were preserved.  After injection of additional 0.5% Marcaine the incisions were then closed beginning with closure of Morena's fascia in a deep dermal fashion using interrupted 3-0 chromic sutures. The skin was then closed with a 4-0  Monocryl in a running subcuticular fashion. Steri-Strips were placed after application of Benzoin ointment . Primapore dressing applied.  The reynolds was removed at this time. The patient was awakened from anesthesia and brought to the PACU in stable condition. The patient tolerated the procedure well and no intraoperative complications were noted.     Plan:  - Follow up as planned in 02/2020, sooner if new concerns arise      I was present and scrubbed for the entire procedure.  Mark Long MD  Urology Staff

## 2019-10-08 NOTE — ANESTHESIA POSTPROCEDURE EVALUATION
Anesthesia POST Procedure Evaluation    Patient: Wes Epperson   MRN:     [id number] Gender:   male   Age:    34 year old :      1985        Preoperative Diagnosis: Bilateral Varicoceles   Procedure(s):  Bilateral Varicocele Repair   Postop Comments: No value filed.       Anesthesia Type:  Not documented  General    Reportable Event: NO     PAIN: Uncomplicated   Sign Out status: Comfortable, Well controlled pain     PONV: No PONV   Sign Out status:  No Nausea or Vomiting     Neuro/Psych: Uneventful perioperative course   Sign Out Status: Preoperative baseline; Age appropriate mentation     Airway/Resp.: Uneventful perioperative course   Sign Out Status: Non labored breathing, age appropriate RR; Resp. Status within EXPECTED Parameters     CV: Uneventful perioperative course   Sign Out status: Appropriate BP and perfusion indices; Appropriate HR/Rhythm     Disposition:   Sign Out in:  PACU  Disposition:  Phase II; Home  Recovery Course: Uneventful  Follow-Up: Not required           Last Anesthesia Record Vitals:  CRNA VITALS  10/8/2019 1137 - 10/8/2019 1237      10/8/2019             Pulse:  87    SpO2:  99 %    Resp Rate (set):  10          Last PACU Vitals:  Vitals Value Taken Time   /83 10/8/2019 12:15 PM   Temp 36.4  C (97.6  F) 10/8/2019 12:10 PM   Pulse 93 10/8/2019 12:15 PM   Resp 4 10/8/2019 12:25 PM   SpO2 94 % 10/8/2019 12:25 PM   Temp src     NIBP     Pulse     SpO2     Resp     Temp     Ht Rate     Temp 2     Vitals shown include unvalidated device data.      Electronically Signed By: Milton Garland MD, 2019

## 2019-10-08 NOTE — DISCHARGE INSTRUCTIONS
[facility]  Home Care Following Anesthesia  For 24 hours after surgery:  1. Get plenty of rest.  A responsible adult must stay with you for at least 24 hours after you leave the surgery center.  2. Do not drive or use heavy equipment.  If you have weakness or tingling, don't drive or use heavy equipment until this feeling goes away.   3. Do not drink alcohol.   4. Avoid strenuous or risky activities.  Ask for help when climbing stairs.  5. You may feel lightheaded.  IF so, sit for a few minutes before standing.  Have someone help you get up.   6. If you have nausea (feel sick to your stomach): Drink only clear liquids such as apple juice, ginger ale, broth or 7-Up.  Rest may also help.  Be sure to drink enough fluids.  Move to a regular diet as you feel able.   7. You may have a slight fever.  Call the doctor if your fever is over 100 F (37.7 C) (taken under the tongue) or lasts longer than 24 hours.  8. You may have a dry mouth, a sore throat, muscle aches or trouble sleeping. These should go away after 24 hours.  9. Do not make important or legal decisions.               Tips for taking pain medications  To get the best pain relief possible, remember these points:    Take pain medications as directed, before pain becomes severe.    Pain medication can upset your stomach: taking it with food may help.    Constipation is a common side effect of pain medication. Drink plenty of  fluids.    Eat foods high in fiber. Take a stool softener if recommended by your doctor or pharmacist.    Do not drink alcohol, drive or operate machinery while taking pain medications.    Ask about other ways to control pain, such as with heat, ice or relaxation.    Tylenol/Acetaminophen Consumption  To help encourage the safe use of acetaminophen, the makers of TYLENOL  have lowered the maximum daily dose for single-ingredient Extra Strength TYLENOL  (acetaminophen) products sold in the [place] from 8  pills per day (4,000 mg) to 6 pills per day (3,000 mg). The dosing interval has also changed from 2 pills every 4-6 hours to 2 pills every 6 hours.    If you feel your pain relief is insufficient, you may take Tylenol/Acetaminophen in addition to your narcotic pain medication.     Be careful not to exceed 3,000 mg of Tylenol/Acetaminophen in a 24 hour period from all sources.    If you are taking extra strength Tylenol/acetaminophen (500 mg), the maximum dose is 6 tablets in 24 hours.    If you are taking regular strength acetaminophen (325 mg), the maximum dose is 9 tablets in 24 hours.    Call a doctor for any of the followin. Signs of infection (fever, growing tenderness at the surgery site, a large amount of drainage or bleeding, severe pain, foul-smelling drainage, redness, swelling).  2. It has been over 8 to 10 hours since surgery and you are still not able to urinate (pass water).  3. Headache for over 24 hours.  4. Numbness, tingling or weakness the day after surgery (if you had spinal anesthesia).  Your doctor is:  Dr. Mark Long, Prostate and Urology: [phone]                    Or dial [phone] and ask for the resident on call for:  Prostate Urology  For emergency care, call the:  [facility]:  [phone] (TTY for hearing impaired: [phone])

## 2019-10-08 NOTE — ANESTHESIA CARE TRANSFER NOTE
Patient: Wes Epperson    Procedure(s):  Bilateral Varicocele Repair    Diagnosis: Bilateral Varicoceles  Diagnosis Additional Information: No value filed.    Anesthesia Type:   General     Note:    Patient transferred to:PACU  Handoff Report: Identifed the Patient, Identified the Reponsible Provider, Reviewed the pertinent medical history, Discussed the surgical course, Reviewed Intra-OP anesthesia mangement and issues during anesthesia, Set expectations for post-procedure period and Allowed opportunity for questions and acknowledgement of understanding      Vitals: (Last set prior to Anesthesia Care Transfer)    CRNA VITALS  10/8/2019 1137 - 10/8/2019 1211      10/8/2019             Pulse:  87    SpO2:  99 %    Resp Rate (set):  10                Electronically Signed By: CHRISTINE PARKER CRNA  October 8, 2019  12:11 PM

## 2019-10-09 ENCOUNTER — PATIENT OUTREACH (OUTPATIENT)
Dept: UROLOGY | Facility: CLINIC | Age: 34
End: 2019-10-09

## 2019-10-09 DIAGNOSIS — I86.1 BILATERAL VARICOCELES: Primary | ICD-10-CM

## 2019-10-09 NOTE — PROGRESS NOTES
Urology Postop Phone Note:    Mr. Wes Epperson is a 34 year old male who underwent bilateral varicocele on 10/8/19 with Dr. Long for a history of varicoceles.  His postoperative course was unremarkable and the patient was d/c to home on 10/8/19.    Fevers/chills: Patient denies any fever or chills.  Eating/drinking: Patient is able to eat and drink without any complaints.  Bowel habits: Patient reports having a normal bowel movement., Patient reports no bowel movement since Monday  Urine output voiding Color yellow Clarity clear Odor none  Incisions: Patient denies any signs and symptoms of infection.  Pain: Patient reports pain as a 5 out of 10.  The pain is located incision site.  Narcotics: The patient has been taking oxycodone for pain medication and is able to control the pain.    Follow up appointment scheduled on 2/7/20 at 240 pm  with Dr. Long.    Informed the patient of the clinic triage nursing # ([phone] option 2) and urology resident on call # for after hours concerns.    Thanks,   Heather Sharma, ANDRE   Department of Urologic Surgery

## 2020-01-30 ENCOUNTER — PRE VISIT (OUTPATIENT)
Dept: UROLOGY | Facility: CLINIC | Age: 35
End: 2020-01-30

## 2020-01-30 NOTE — TELEPHONE ENCOUNTER
Reason for Visit: S/P bilateral varicocele repair    Diagnosis: testicular pain    Orders/Procedures/Records: in system    Contact Patient: n/a    Rooming Requirements: magda Gillespie LPN  01/30/20  3:35 PM

## 2020-02-17 ENCOUNTER — HEALTH MAINTENANCE LETTER (OUTPATIENT)
Age: 35
End: 2020-02-17

## 2020-06-04 ENCOUNTER — OFFICE VISIT (OUTPATIENT)
Dept: URGENT CARE | Facility: URGENT CARE | Age: 35
End: 2020-06-04
Payer: COMMERCIAL

## 2020-06-04 VITALS
DIASTOLIC BLOOD PRESSURE: 78 MMHG | BODY MASS INDEX: 27.35 KG/M2 | TEMPERATURE: 98.8 F | SYSTOLIC BLOOD PRESSURE: 121 MMHG | WEIGHT: 220 LBS | HEART RATE: 85 BPM | OXYGEN SATURATION: 98 % | HEIGHT: 75 IN

## 2020-06-04 DIAGNOSIS — S39.012A LOW BACK STRAIN, INITIAL ENCOUNTER: Primary | ICD-10-CM

## 2020-06-04 PROCEDURE — 99214 OFFICE O/P EST MOD 30 MIN: CPT | Performed by: FAMILY MEDICINE

## 2020-06-04 RX ORDER — CYCLOBENZAPRINE HCL 10 MG
5-10 TABLET ORAL 3 TIMES DAILY PRN
Qty: 20 TABLET | Refills: 0 | Status: SHIPPED | OUTPATIENT
Start: 2020-06-04 | End: 2022-03-11

## 2020-06-04 ASSESSMENT — MIFFLIN-ST. JEOR: SCORE: 2000.6

## 2020-06-04 NOTE — PROGRESS NOTES
"Subjective:   Wes Epperson is a 35 year old male who presents for   Chief Complaint   Patient presents with     Urgent Care     Pt in clinic to have eval for back pain.     Back Pain     Injury sustained playing disc golf 4 days ago, stretched the next day but the discomfort persists. Had tried alternating heat/ice.     Shooting discomfort from both hips and radiating upwards. The pain goes up to mid-lumbar both sides.   No difficulties with bowel/bladder. Feels slight weakness in his legs possibly due to the discomfort with certain movements. Aggravating movements: getting up from rest, walking. Prefers to be in a slouched position.     For pain he has not taken any medications.     Had back strain injury years ago at work - that improved with stretching. Did not have to go to PT or chiropractor. He was prescribed with tylenol.     Has also tried vicks vapor rub.     There are no active problems to display for this patient.      Current Outpatient Medications   Medication     cyclobenzaprine (FLEXERIL) 10 MG tablet     acetaminophen (TYLENOL) 325 MG tablet     oxyCODONE (ROXICODONE) 5 MG tablet     varenicline (CHANTIX STARTING MONTH PAK) 0.5 MG X 11 & 1 MG X 42 tablet     varenicline (CHANTIX) 1 MG tablet     No current facility-administered medications for this visit.      ROS:  As above per HPI    Objective:   /78   Pulse 85   Temp 98.8  F (37.1  C) (Oral)   Ht 1.892 m (6' 2.5\")   Wt 99.8 kg (220 lb)   SpO2 98%   BMI 27.87 kg/m  , Body mass index is 27.87 kg/m .  Gen:  NAD, well-nourished, sitting in chair comfortably  HEENT: EOMI, sclera anicteric, Head normocephalic, ; nares patent; moist mucous membranes  Neck: trachea midline, no thyromegaly  CV:  Hemodynamically stable  Pulm:  no increased work of breathing , CTAB, no wheezes/rales/rhonchi   ABD: soft, non-distended, mildly obese  Extrem: no cyanosis, edema or clubbing  Skin: no obvious rashes or abnormalities  Psych: Euthymic, linear " thoughts, normal rate of speech  MSK: 5/5 strength hip flexion and leg flexion and extension bilaterally  Back: lumbar discomfort bilaterally, no midline pain or pain with palpation. NO CVA tenderness. Able to flex to 45 degrees, can achieve a straightened back but there is discomfort with attempted extension  Neuro: patellar reflexes 2/4 bilaterally  Gait: antalgic    No results found for any visits on 06/04/20.    Assessment & Plan:   Wes Epperson, 35 year old male who presents with:    Low back strain, initial encounter  Suspicion for the pinched nerve is low at this time thus x-ray was not performed especially with no previous history of back injuries or slipped disc.  I feel that his symptoms are indicative of low back strain.  We discussed the importance of using medications and activity to try to preserve strength of core muscles in order to properly address this low back strain.  Ibuprofen and Tylenol recommended in addition I have given him a prescription for Flexeril to be used as needed.  He was cautioned on the possible risk of dizziness with medication use thus 5 mg as a starting dose was prescribed.  Can move up to 10 mg of tolerating.  If no improvement in next 3 to 5 days he is advised to see physical therapy.  He was given a work letter to miss the remainder of the week as he works Thursday Friday Saturday with no scheduled return to work until next week.  If having any setbacks may need further removal from work if they are not able to accommodate for his injury. Heat packs were encouraged over icing at this time  Suspicion for cauda equina syndrome low.   - cyclobenzaprine (FLEXERIL) 10 MG tablet  Dispense: 20 tablet; Refill: 0  - PHYSICAL THERAPY REFERRAL      Abhinav Clark MD   [facility]    The use of Dragon/Mompery dictation services may have been used to construct the content in this note; any grammatical or spelling errors are non-intentional. Please contact the author  of this note directly if you are in need of any clarification.

## 2020-06-04 NOTE — PATIENT INSTRUCTIONS
Ibuprofen 400-600mg every 4-6 hours as needed for pain; Tylenol 500-650mg every 4-6 hours as needed for pain    Flexeril 5-10mg every 8 hours as needed for tightness/muscle spasms      Prefer you use heat packs rather than ice at this point      Try to stay in the upright position (laying down can weaken your core/ab muscles)  When awake, transition to upright position/walking with moderate activity as symptoms improve    If symptoms worsen please call us right away    If symptoms don't improve in the next 3-5 days let's get you in with physical therapy for an appointment: call [phone]

## 2020-06-04 NOTE — LETTER
June 4, 2020      Wes Epperson  [address] [facility] [address]        To Whom It May Concern:    Wes Epperson was seen in our clinic and is being treated for an injury, for safety reasons I am recommending he take the remainder of this week off. If he had significant improvement going into early next week and is able to perform his duties confidently then he has okay to return starting Thursday the 11th      Sincerely,        Abhinav Clark MD

## 2020-11-29 ENCOUNTER — HEALTH MAINTENANCE LETTER (OUTPATIENT)
Age: 35
End: 2020-11-29

## 2021-04-10 ENCOUNTER — HEALTH MAINTENANCE LETTER (OUTPATIENT)
Age: 36
End: 2021-04-10

## 2021-09-25 ENCOUNTER — HEALTH MAINTENANCE LETTER (OUTPATIENT)
Age: 36
End: 2021-09-25

## 2022-03-07 ENCOUNTER — ANCILLARY PROCEDURE (OUTPATIENT)
Dept: GENERAL RADIOLOGY | Facility: CLINIC | Age: 37
End: 2022-03-07
Attending: PHYSICIAN ASSISTANT
Payer: COMMERCIAL

## 2022-03-07 ENCOUNTER — OFFICE VISIT (OUTPATIENT)
Dept: URGENT CARE | Facility: URGENT CARE | Age: 37
End: 2022-03-07
Payer: COMMERCIAL

## 2022-03-07 VITALS
SYSTOLIC BLOOD PRESSURE: 130 MMHG | DIASTOLIC BLOOD PRESSURE: 82 MMHG | HEART RATE: 90 BPM | OXYGEN SATURATION: 90 % | RESPIRATION RATE: 16 BRPM

## 2022-03-07 DIAGNOSIS — S70.01XA CONTUSION OF RIGHT HIP REGION: ICD-10-CM

## 2022-03-07 DIAGNOSIS — S39.92XA LOWER BACK INJURY, INITIAL ENCOUNTER: ICD-10-CM

## 2022-03-07 DIAGNOSIS — M62.830 SPASM OF BACK MUSCLES: ICD-10-CM
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
## 2022-03-07 DIAGNOSIS — S09.90XA CLOSED HEAD INJURY, INITIAL ENCOUNTER: ICD-10-CM

## 2022-03-07 PROCEDURE — 99213 OFFICE O/P EST LOW 20 MIN: CPT | Performed by: PHYSICIAN ASSISTANT

## 2022-03-07 PROCEDURE — 72100 X-RAY EXAM L-S SPINE 2/3 VWS: CPT | Performed by: RADIOLOGY

## 2022-03-07 PROCEDURE — 73502 X-RAY EXAM HIP UNI 2-3 VIEWS: CPT | Mod: RT | Performed by: RADIOLOGY

## 2022-03-07 RX ORDER — METHOCARBAMOL 750 MG/1
750 TABLET, FILM COATED ORAL 3 TIMES DAILY
Qty: 30 TABLET | Refills: 0 | Status: SHIPPED | OUTPATIENT
Start: 2022-03-07

## 2022-03-07 RX ORDER — IBUPROFEN 800 MG/1
800 TABLET, FILM COATED ORAL EVERY 8 HOURS PRN
Qty: 30 TABLET | Refills: 0 | Status: SHIPPED | OUTPATIENT
Start: 2022-03-07 | End: 2023-03-07

## 2022-03-08 NOTE — PATIENT INSTRUCTIONS
Patient Education     Relieving Back Pain  Back pain is a common problem. You can strain back muscles by lifting too much weight or just by moving the wrong way. Back strain can be uncomfortable, even painful. And it can take weeks or months to improve. To help yourself feel better and prevent future back strains, try these tips.  Important: Don't give aspirin to children or teens without first discussing it with your child's healthcare provider.  Ice    Ice reduces muscle pain and swelling. It helps most during the first 24 to 48 hours after an injury.    Wrap an ice pack or a bag of frozen peas in a thin towel. Never put ice directly on your skin.    Place the ice where your back hurts the most.    Don t ice for more than 20 minutes at a time.    You can use ice several times a day.  Medicines  Over-the-counter pain relievers include acetaminophen and anti-inflammatory medicines, which includes aspirin, naproxen, or ibuprofen. They can help ease discomfort. Some also reduce swelling.    Tell your healthcare provider about any medicines you are already taking.    Take medicines only as directed.  Manipulation and massage  Having manipulation by an osteopathic doctor or chiropractor may be helpful. Getting a massage also may help.   Heat  After the first 48 hours, heat can relax sore muscles and improve blood flow.    Try a warm bath or shower. Or use a heating pad set on low. To prevent a burn, keep a cloth between you and the heating pad.    Don t use a heating pad for more than 15 minutes at a time. Never sleep on a heating pad.  [employer] last reviewed this educational content on 6/1/2018 2000-2021 [employer]. All rights reserved. This information is not intended as a substitute for professional medical care. Always follow your healthcare professional's instructions.           Patient Education     Relieving Tension in Your Back  Being relaxed helps keep your mind healthy and your back ready  to move. Take short breaks often. Walk around. Stretch. Switch tasks. Also give the following a try.  Make time to relax. Start by setting aside 5 minutes daily.  Deep breathing    Deep breathing is a simple way to reduce stress. You can do it almost any time you need to relax.    Inhale slowly through your nose. Let your lungs and stomach expand.    Hold your breath for 2 to 3 seconds.    Exhale slowly through your mouth until your lungs feel empty. Repeat 3 to 4 times.  Relieve tension  Muscle tension can create tender spots called trigger points. The tips below may help relieve muscle tension.    Press the trigger point if you can reach it. If not, lie on a soft tennis ball, or ask a friend to press the spot. Use steady pressure for 10 to 15 seconds. Breathe deeply. Repeat a few times.    Massage trigger points with ice for 2 to 5 minutes. Press lightly at first. Slowly increase firmness.  [employer] last reviewed this educational content on 5/1/2018 2000-2021 [employer]. All rights reserved. This information is not intended as a substitute for professional medical care. Always follow your healthcare professional's instructions.           Patient Education     Possible Causes of Low Back or Leg Pain    BIG: The symptoms in your back or leg may be due to pressure on a nerve. This pressure may be caused by a damaged disk or by abnormal bone growth. Either way, you may feel pain, burning, tingling, or numbness. If you have pressure on a nerve that connects to the sciatic nerve, pain may shoot down your leg.    Pressure from the disk  Constant wear and tear can weaken a disk over time and cause back pain. The disk can then be damaged by a sudden movement or injury. If its soft center starts to bulge, the disk may press on a nerve. Or the outside of the disk may tear, and the soft center may squeeze through and pinch a nerve.    Pressure from bone  As a disk wears out, the vertebrae right above and  below the disk start to touch. This can put pressure on a nerve. Often, abnormal bone (called bone spurs) grows where the vertebrae rub against each other. This can cause the foramen or the spinal canal to narrow (called stenosis) and press against a nerve.  [employer] last reviewed this educational content on 3/1/2018    6367-2537 [employer]. All rights reserved. This information is not intended as a substitute for professional medical care. Always follow your healthcare professional's instructions.

## 2022-03-08 NOTE — PROGRESS NOTES
Assessment & Plan     Fall down stairs, initial encounter  Fall down a few steps, Saturday    Closed head injury, initial encounter  Head injury, no LOC  No symptoms of concussion or signs or bleed  No need for head CT at this time  Monitor for any symptoms in the head injury info given and follow up if they present    Lower back injury, initial encounter  Lumbar xray Negative for acute findings, read by Cody WALTERS at time of visit.  Ice compresses and alternate with warm moist compresses  Robaxin for muscle tension  Motrin for lower back pain, tenderness  Follow up with PCP as needed  - XR Lumbar Spine 2/3 Views; Future  - ibuprofen (ADVIL/MOTRIN) 800 MG tablet; Take 1 tablet (800 mg) by mouth every 8 hours as needed    Contusion of right hip region  Hip xray Negative for acute findings, read by Cody WALTERS at time of visit.  Ice compresses  Motrin for hip tenderness, pain  Robaxin for muscle tension and tightness  - XR Hip Right 2-3 Views; Future  - ibuprofen (ADVIL/MOTRIN) 800 MG tablet; Take 1 tablet (800 mg) by mouth every 8 hours as needed    Spasm of back muscles  Warm moist heat  Robaxin for muscle tightness and spasms  - methocarbamol (ROBAXIN) 750 MG tablet; Take 1 tablet (750 mg) by mouth 3 times daily    Results for orders placed or performed in visit on 03/07/22   XR Hip Right 2-3 Views     Status: None    Narrative    EXAM: XR HIP RIGHT 2-3 VIEWS  LOCATION: [facility]  DATE/TIME: 3/7/2022 6:42 PM    INDICATION: Contusion of right hip region  COMPARISON: None.      Impression    IMPRESSION: No acute fracture or malalignment. There is normal right hip joint spacing. Focal sclerosis overlying the femoral head-neck junction laterally may represent a bone island or overlying chronic ossicle.   Results for orders placed or performed in visit on 03/07/22   XR Lumbar Spine 2/3 Views     Status: None    Narrative    EXAM: XR LUMBAR SPINE 2-3 VIEWS  LOCATION: [facility]  [facility]  DATE/TIME: 3/7/2022 6:41 PM    INDICATION:  Lower back injury, initial encounter  COMPARISON: None.  TECHNIQUE: CR Lumbar Spine.      Impression    IMPRESSION:     Nomenclature presumes 5 lumbar type vertebral bodies.    Vertebral body heights are unremarkable.    There is straightening of the normal cervical lordosis.    Mild L4-L5 and L5-S1 intervertebral disc height loss may reflect underlying spondylosis.    Pedicles appear symmetric.    The imaged portion of the sacrum appears grossly intact. However, it is partially obscured by stool and bowel gas.          Tobacco Cessation:   reports that he has been smoking cigarettes. He has a 3.00 pack-year smoking history. He has never used smokeless tobacco.    No follow-ups on file.    HESHAM Martin [facility] JOE Harvey is a 36 year old who presents for the following health issues    HPI     Fell down stairs 2 days ago  Injured right hip and lower back area  Muscles are tight and sore  Hit head, but that seems to be ok now    Review of Systems   Constitutional, HEENT, cardiovascular, pulmonary, gi and gu systems are negative, except as otherwise noted.      Objective    /82   Pulse 90   Resp 16   SpO2 90%   There is no height or weight on file to calculate BMI.  Physical Exam   GENERAL: healthy, alert and no distress  EYES: Eyes grossly normal to inspection, PERRL and conjunctivae and sclerae normal  NECK: no adenopathy, no asymmetry, masses, or scars and thyroid normal to palpation  RESP: lungs clear to auscultation - no rales, rhonchi or wheezes  CV: regular rate and rhythm, normal S1 S2, no S3 or S4, no murmur, click or rub, no peripheral edema and peripheral pulses strong  MS: Positive for tenderness around mid lumbar area and into the gluteal area  HIP: FUll ROM of hip. Tenderness around hip abductor muscles, no signs of bruising, no groin pain present  SKIN: no suspicious lesions or  rashes  NEURO: Normal strength and tone, mentation intact and speech normal. Normal gait.

## 2022-03-10 ENCOUNTER — TELEPHONE (OUTPATIENT)
Dept: NEUROLOGY | Facility: CLINIC | Age: 37
End: 2022-03-10
Payer: COMMERCIAL

## 2022-03-10 NOTE — TELEPHONE ENCOUNTER
I called pt to get more information on why he was scheduled with NeuroSurgery.  Pt was just seen 3 days ago in UC for back pain.  Pt does not have a referral for Neurosurgery and should Follow-up with PCP next week. I LM for pt to CB and discuss the information above.    Ledy Gar LPN

## 2022-03-11 ENCOUNTER — OFFICE VISIT (OUTPATIENT)
Dept: FAMILY MEDICINE | Facility: CLINIC | Age: 37
End: 2022-03-11
Payer: COMMERCIAL

## 2022-03-11 VITALS
RESPIRATION RATE: 16 BRPM | OXYGEN SATURATION: 99 % | HEART RATE: 93 BPM | SYSTOLIC BLOOD PRESSURE: 123 MMHG | WEIGHT: 197 LBS | TEMPERATURE: 97.3 F | DIASTOLIC BLOOD PRESSURE: 83 MMHG | HEIGHT: 75 IN | BODY MASS INDEX: 24.49 KG/M2

## 2022-03-11 DIAGNOSIS — M54.41 ACUTE RIGHT-SIDED LOW BACK PAIN WITH RIGHT-SIDED SCIATICA: Primary | ICD-10-CM

## 2022-03-11 PROCEDURE — 99213 OFFICE O/P EST LOW 20 MIN: CPT | Performed by: NURSE PRACTITIONER

## 2022-03-11 ASSESSMENT — PAIN SCALES - GENERAL: PAINLEVEL: EXTREME PAIN (8)

## 2022-03-11 NOTE — LETTER
[address], [address]  [facility] [address]  Phone: [phone]    March 11, 2022        Wes [address]pperson  [address] [place] [address]          To whom it may concern:    RE: Wes DAVID Zhou      Patient was seen and treated today at our clinic and missed work 3/8-3/11      Please contact me for questions or concerns.      Sincerely,        CHRISTINE Alvarez CNP

## 2022-03-11 NOTE — PROGRESS NOTES
Assessment & Plan   Problem List Items Addressed This Visit     None      Visit Diagnoses     Acute right-sided low back pain with right-sided sciatica    -  Primary    Relevant Orders    Physical Therapy Referral         Persistent LBP after a fall down stairs and lifting something heavy. No red flags. Already had xrays. Will refer to PT. Continue with tyl/ibu and muscle relaxant. Use heat and salonpas or icy/hot patches. Follow-up with worsening symptoms       CHRISTINE Alvarez CNP  [facility] LUPE Harvey is a 36 year old who presents for the following health issues     History of Present Illness       Back Pain:  He presents for follow up of back pain. Patient's back pain is a new problem.    Original cause of back pain: a fall  First noticed back pain: in the last week  Patient feels back pain: constantlyLocation of back pain:  Right lower back, left lower back, right hip and right side of waist  Description of back pain: sharp, shooting and other  Back pain spreads: right thigh and right shoulder    Since patient first noticed back pain, pain is: gradually improving  Does back pain interfere with his job:  Yes  On a scale of 1-10 (10 being the worst), patient describes pain as:  10  What makes back pain worse: bending, coughing, certain positions, sitting, standing and twisting  Acupuncture: not tried  Acetaminophen: not helpful  Activity or exercise: not tried  Chiropractor:  Not tried  Cold: not helpful  Heat: helpful  Massage: not tried  Muscle relaxants: not tried  NSAIDS: helpful  Opioids: not tried  Physical Therapy: not tried  Rest: helpful  Steroid Injection: not tried  Stretching: not tried  Surgery: not tried  TENS unit: not tried  Topical pain relievers: not tried  Other healthcare providers patient is seeing for back pain: None    He eats 2-3 servings of fruits and vegetables daily.He consumes 1 sweetened beverage(s) daily.He exercises with enough effort to  "increase his heart rate 20 to 29 minutes per day.  He exercises with enough effort to increase his heart rate 5 days per week.   He is taking medications regularly.       ED/UC Followup:    Facility:  [facility]  Date of visit: 03/07/22  Reason for visit: Fall  Current Status: Back pain is getting slightly better, pain is still there but not as often.     Walking down stairs, his right foot missed the step and he landed hard onto his bottom and went down several stairs   Continues with R LBP  The next day his back was sore   He went to work 2 days after and was ok   He went to lift a bag at work and that caused severe pain but got better   Then later that day was walking and his back seized up  This is when he was seen in UC   Pain previously went down to ankle and also had shooting pain into groin       Review of Systems   Detailed as above      Objective    /83 (BP Location: Right arm, Patient Position: Sitting, Cuff Size: Adult Regular)   Pulse 93   Temp 97.3  F (36.3  C) (Temporal)   Resp 16   Ht 1.892 m (6' 2.5\")   Wt 89.4 kg (197 lb)   SpO2 99%   BMI 24.95 kg/m    There is no height or weight on file to calculate BMI.  Physical Exam  Constitutional:       Appearance: Normal appearance.      Comments: Appears uncomfortable. Unable to stand up straight   Pulmonary:      Effort: Pulmonary effort is normal.   Musculoskeletal:      Comments: Significant tenderness of right lumbar region   Neurological:      Mental Status: He is alert.      Comments: Slow gait   Psychiatric:         Mood and Affect: Mood normal.            "

## 2022-05-07 ENCOUNTER — HEALTH MAINTENANCE LETTER (OUTPATIENT)
Age: 37
End: 2022-05-07

## 2022-12-26 ENCOUNTER — HEALTH MAINTENANCE LETTER (OUTPATIENT)
Age: 37
End: 2022-12-26

## 2023-06-02 ENCOUNTER — HEALTH MAINTENANCE LETTER (OUTPATIENT)
Age: 38
End: 2023-06-02

## 2024-06-23 ENCOUNTER — HEALTH MAINTENANCE LETTER (OUTPATIENT)
Age: 39
End: 2024-06-23

## 2025-06-24 ENCOUNTER — APPOINTMENT (OUTPATIENT)
Dept: CT IMAGING | Facility: CLINIC | Age: 40
End: 2025-06-24
Attending: EMERGENCY MEDICINE
Payer: COMMERCIAL

## 2025-06-24 ENCOUNTER — HOSPITAL ENCOUNTER (EMERGENCY)
Facility: CLINIC | Age: 40
Discharge: HOME | End: 2025-06-24
Attending: EMERGENCY MEDICINE | Admitting: EMERGENCY MEDICINE
Payer: COMMERCIAL

## 2025-06-24 ENCOUNTER — APPOINTMENT (OUTPATIENT)
Dept: ULTRASOUND IMAGING | Facility: CLINIC | Age: 40
End: 2025-06-24
Attending: EMERGENCY MEDICINE
Payer: COMMERCIAL

## 2025-06-24 VITALS
SYSTOLIC BLOOD PRESSURE: 129 MMHG | DIASTOLIC BLOOD PRESSURE: 86 MMHG | OXYGEN SATURATION: 100 % | HEART RATE: 74 BPM | TEMPERATURE: 97.5 F

## 2025-06-24 DIAGNOSIS — N20.0 KIDNEY STONE: ICD-10-CM

## 2025-06-24 DIAGNOSIS — N23 RENAL COLIC ON LEFT SIDE: ICD-10-CM

## 2025-06-24 LAB
ALBUMIN SERPL BCG-MCNC: 4.6 G/DL (ref 3.5–5.2)
ALP SERPL-CCNC: 76 U/L (ref 40–150)
ALT SERPL W P-5'-P-CCNC: 58 U/L (ref 0–70)
ANION GAP SERPL CALCULATED.3IONS-SCNC: 11 MMOL/L (ref 7–15)
APTT PPP: 27 SECONDS (ref 22–38)
AST SERPL W P-5'-P-CCNC: 27 U/L (ref 0–45)
BASOPHILS # BLD AUTO: 0 10E3/UL (ref 0–0.2)
BASOPHILS NFR BLD AUTO: 0 %
BILIRUB SERPL-MCNC: 0.6 MG/DL
BUN SERPL-MCNC: 10.4 MG/DL (ref 6–20)
CALCIUM SERPL-MCNC: 9.4 MG/DL (ref 8.8–10.4)
CHLORIDE SERPL-SCNC: 101 MMOL/L (ref 98–107)
CREAT SERPL-MCNC: 1.26 MG/DL (ref 0.67–1.17)
EGFRCR SERPLBLD CKD-EPI 2021: 73 ML/MIN/1.73M2
EOSINOPHIL # BLD AUTO: 0 10E3/UL (ref 0–0.7)
EOSINOPHIL NFR BLD AUTO: 0 %
ERYTHROCYTE [DISTWIDTH] IN BLOOD BY AUTOMATED COUNT: 12.2 % (ref 10–15)
GLUCOSE SERPL-MCNC: 127 MG/DL (ref 70–99)
HCO3 SERPL-SCNC: 23 MMOL/L (ref 22–29)
HCT VFR BLD AUTO: 48.9 % (ref 40–53)
HGB BLD-MCNC: 17 G/DL (ref 13.3–17.7)
IMM GRANULOCYTES # BLD: 0.1 10E3/UL
IMM GRANULOCYTES NFR BLD: 1 %
INR PPP: 0.91 (ref 0.85–1.15)
LACTATE SERPL-SCNC: 1.7 MMOL/L (ref 0.7–2)
LACTATE SERPL-SCNC: 2.7 MMOL/L (ref 0.7–2)
LIPASE SERPL-CCNC: 29 U/L (ref 13–60)
LYMPHOCYTES # BLD AUTO: 1.5 10E3/UL (ref 0.8–5.3)
LYMPHOCYTES NFR BLD AUTO: 11 %
MCH RBC QN AUTO: 29.8 PG (ref 26.5–33)
MCHC RBC AUTO-ENTMCNC: 34.8 G/DL (ref 31.5–36.5)
MCV RBC AUTO: 86 FL (ref 78–100)
MONOCYTES # BLD AUTO: 0.8 10E3/UL (ref 0–1.3)
MONOCYTES NFR BLD AUTO: 6 %
NEUTROPHILS # BLD AUTO: 11.3 10E3/UL (ref 1.6–8.3)
NEUTROPHILS NFR BLD AUTO: 83 %
NRBC # BLD AUTO: 0 10E3/UL
NRBC BLD AUTO-RTO: 0 /100
PLATELET # BLD AUTO: 192 10E3/UL (ref 150–450)
POTASSIUM SERPL-SCNC: 4.4 MMOL/L (ref 3.4–5.3)
PROT SERPL-MCNC: 7.4 G/DL (ref 6.4–8.3)
PROTHROMBIN TIME: 12.6 SECONDS (ref 11.8–14.8)
RBC # BLD AUTO: 5.7 10E6/UL (ref 4.4–5.9)
SODIUM SERPL-SCNC: 135 MMOL/L (ref 135–145)
TROPONIN T SERPL HS-MCNC: <6 NG/L
WBC # BLD AUTO: 13.7 10E3/UL (ref 4–11)

## 2025-06-24 PROCEDURE — 99285 EMERGENCY DEPT VISIT HI MDM: CPT | Performed by: EMERGENCY MEDICINE

## 2025-06-24 PROCEDURE — 85610 PROTHROMBIN TIME: CPT | Performed by: EMERGENCY MEDICINE

## 2025-06-24 PROCEDURE — 83605 ASSAY OF LACTIC ACID: CPT | Performed by: EMERGENCY MEDICINE

## 2025-06-24 PROCEDURE — 250N000011 HC RX IP 250 OP 636: Performed by: EMERGENCY MEDICINE

## 2025-06-24 PROCEDURE — 96361 HYDRATE IV INFUSION ADD-ON: CPT | Performed by: EMERGENCY MEDICINE

## 2025-06-24 PROCEDURE — 96375 TX/PRO/DX INJ NEW DRUG ADDON: CPT | Performed by: EMERGENCY MEDICINE

## 2025-06-24 PROCEDURE — 84295 ASSAY OF SERUM SODIUM: CPT | Performed by: EMERGENCY MEDICINE

## 2025-06-24 PROCEDURE — 96376 TX/PRO/DX INJ SAME DRUG ADON: CPT | Performed by: EMERGENCY MEDICINE

## 2025-06-24 PROCEDURE — 84484 ASSAY OF TROPONIN QUANT: CPT | Performed by: EMERGENCY MEDICINE

## 2025-06-24 PROCEDURE — 99285 EMERGENCY DEPT VISIT HI MDM: CPT | Mod: 25 | Performed by: EMERGENCY MEDICINE

## 2025-06-24 PROCEDURE — 258N000003 HC RX IP 258 OP 636: Performed by: EMERGENCY MEDICINE

## 2025-06-24 PROCEDURE — 85025 COMPLETE CBC W/AUTO DIFF WBC: CPT | Performed by: EMERGENCY MEDICINE

## 2025-06-24 PROCEDURE — 36415 COLL VENOUS BLD VENIPUNCTURE: CPT | Performed by: EMERGENCY MEDICINE

## 2025-06-24 PROCEDURE — 93976 VASCULAR STUDY: CPT

## 2025-06-24 PROCEDURE — 74176 CT ABD & PELVIS W/O CONTRAST: CPT

## 2025-06-24 PROCEDURE — 85730 THROMBOPLASTIN TIME PARTIAL: CPT | Performed by: EMERGENCY MEDICINE

## 2025-06-24 PROCEDURE — 83690 ASSAY OF LIPASE: CPT | Performed by: EMERGENCY MEDICINE

## 2025-06-24 PROCEDURE — 96374 THER/PROPH/DIAG INJ IV PUSH: CPT | Performed by: EMERGENCY MEDICINE

## 2025-06-24 RX ORDER — DIMENHYDRINATE 50 MG
50 TABLET ORAL AT BEDTIME
Qty: 7 TABLET | Refills: 0 | Status: SHIPPED | OUTPATIENT
Start: 2025-06-24 | End: 2025-07-01

## 2025-06-24 RX ORDER — KETOROLAC TROMETHAMINE 15 MG/ML
15 INJECTION, SOLUTION INTRAMUSCULAR; INTRAVENOUS ONCE
Status: COMPLETED | OUTPATIENT
Start: 2025-06-24 | End: 2025-06-24

## 2025-06-24 RX ORDER — MORPHINE SULFATE 2 MG/ML
4 INJECTION, SOLUTION INTRAMUSCULAR; INTRAVENOUS ONCE
Refills: 0 | Status: COMPLETED | OUTPATIENT
Start: 2025-06-24 | End: 2025-06-24

## 2025-06-24 RX ORDER — ONDANSETRON 4 MG/1
4 TABLET, ORALLY DISINTEGRATING ORAL EVERY 8 HOURS PRN
Qty: 9 TABLET | Refills: 0 | Status: SHIPPED | OUTPATIENT
Start: 2025-06-24 | End: 2025-06-27

## 2025-06-24 RX ORDER — ONDANSETRON 2 MG/ML
4 INJECTION INTRAMUSCULAR; INTRAVENOUS EVERY 30 MIN PRN
Status: DISCONTINUED | OUTPATIENT
Start: 2025-06-24 | End: 2025-06-24 | Stop reason: HOSPADM

## 2025-06-24 RX ORDER — ACETAMINOPHEN 500 MG
1000 TABLET ORAL EVERY 6 HOURS
Qty: 56 TABLET | Refills: 0 | Status: SHIPPED | OUTPATIENT
Start: 2025-06-24 | End: 2025-07-01

## 2025-06-24 RX ORDER — IBUPROFEN 200 MG
400 TABLET ORAL EVERY 6 HOURS
Qty: 56 TABLET | Refills: 0 | Status: SHIPPED | OUTPATIENT
Start: 2025-06-24 | End: 2025-07-01

## 2025-06-24 RX ORDER — DIMENHYDRINATE 50 MG
50 TABLET ORAL EVERY 6 HOURS PRN
Qty: 28 TABLET | Refills: 0 | Status: SHIPPED | OUTPATIENT
Start: 2025-06-24 | End: 2025-07-01

## 2025-06-24 RX ADMIN — ONDANSETRON 4 MG: 2 INJECTION INTRAMUSCULAR; INTRAVENOUS at 08:26

## 2025-06-24 RX ADMIN — MORPHINE SULFATE 4 MG: 2 INJECTION, SOLUTION INTRAMUSCULAR; INTRAVENOUS at 08:27

## 2025-06-24 RX ADMIN — KETOROLAC TROMETHAMINE 15 MG: 15 INJECTION, SOLUTION INTRAMUSCULAR; INTRAVENOUS at 08:27

## 2025-06-24 RX ADMIN — MORPHINE SULFATE 4 MG: 2 INJECTION, SOLUTION INTRAMUSCULAR; INTRAVENOUS at 10:30

## 2025-06-24 RX ADMIN — SODIUM CHLORIDE, SODIUM LACTATE, POTASSIUM CHLORIDE, AND CALCIUM CHLORIDE 1000 ML: .6; .31; .03; .02 INJECTION, SOLUTION INTRAVENOUS at 08:56

## 2025-06-24 ASSESSMENT — COLUMBIA-SUICIDE SEVERITY RATING SCALE - C-SSRS
6. HAVE YOU EVER DONE ANYTHING, STARTED TO DO ANYTHING, OR PREPARED TO DO ANYTHING TO END YOUR LIFE?: NO
1. IN THE PAST MONTH, HAVE YOU WISHED YOU WERE DEAD OR WISHED YOU COULD GO TO SLEEP AND NOT WAKE UP?: NO
2. HAVE YOU ACTUALLY HAD ANY THOUGHTS OF KILLING YOURSELF IN THE PAST MONTH?: NO

## 2025-06-24 ASSESSMENT — ACTIVITIES OF DAILY LIVING (ADL)
ADLS_ACUITY_SCORE: 41

## 2025-06-24 NOTE — DISCHARGE INSTRUCTIONS
You have a 3 mm kidney stone -please follow the kidney stone instructions and take as needed medications.    You will have a referral call for urology follow-up in office.    Please return to emergency department for fever>104F, persistent vomiting, worsening chest pain, shortness of breath

## 2025-06-24 NOTE — ED PROVIDER NOTES
[facility] EMERGENCY DEPARTMENT ([facility])    6/24/25      ED PROVIDER NOTE   ED 6  History     Chief Complaint   Patient presents with    Abdominal Pain     left Flank Pain     The history is provided by the patient and medical records.     Wes Epperson is a 40 year old male distant history of last varicocele repair by urology in 2019 I have reviewed urology's note on 10/8/2019 patient now presents with severe colicky left flank pain radiating to his left groin. No dysuria or known fevers. No prior kidney stones.    This part of the document was transcribed by Jessy Wilkinson, Medical Scribe.      Physical Exam   BP: 129/86  Pulse: 74  Temp: 97.5  F (36.4  C)  SpO2: 100 %  Physical Exam    Patient appears in severe pain  Tender to palpation in mid abdomen   Mild CVA tenderness tenderness with low riding testicle, not high riding testicle on the left side without redness or crepitus    ED Course, Procedures, & Data      Procedures           IV Antibiotics given and/or elevated Lactate of 2.7 and no sepsis note found - Delete this reminder and enter the sepsis note or '.edcms' before signing chart.>>>     Results for orders placed or performed during the hospital encounter of 06/24/25   CT Abdomen Pelvis w/o Contrast    Impression    IMPRESSION:   1.  A 3 mm stone at the left ureterovesical junction protruding into the bladder lumen causing proximal obstruction.  2.  Kidneys, ureters and bladder are otherwise normal.  3.  Mild right coronary artery calcification is premature for patient age.     US Testicular & Scrotum w Doppler Ltd    Impression    IMPRESSION:  1.  Normal-appearing testicles.  2.  Small bilateral hydroceles.  3.  Small left varicocele.   INR   Result Value Ref Range    INR 0.91 0.85 - 1.15    PT 12.6 11.8 - 14.8 Seconds   Partial thromboplastin time   Result Value Ref Range    aPTT 27 22 - 38 Seconds   Comprehensive metabolic panel   Result Value Ref Range    Sodium 135 135 - 145  mmol/L    Potassium 4.4 3.4 - 5.3 mmol/L    Carbon Dioxide (CO2) 23 22 - 29 mmol/L    Anion Gap 11 7 - 15 mmol/L    Urea Nitrogen 10.4 6.0 - 20.0 mg/dL    Creatinine 1.26 (H) 0.67 - 1.17 mg/dL    GFR Estimate 73 >60 mL/min/1.73m2    Calcium 9.4 8.8 - 10.4 mg/dL    Chloride 101 98 - 107 mmol/L    Glucose 127 (H) 70 - 99 mg/dL    Alkaline Phosphatase 76 40 - 150 U/L    AST 27 0 - 45 U/L    ALT 58 0 - 70 U/L    Protein Total 7.4 6.4 - 8.3 g/dL    Albumin 4.6 3.5 - 5.2 g/dL    Bilirubin Total 0.6 <=1.2 mg/dL   Lactic acid whole blood with 1x repeat in 2 hr when >2   Result Value Ref Range    Lactic Acid, Initial 2.7 (H) 0.7 - 2.0 mmol/L   Result Value Ref Range    Lipase 29 13 - 60 U/L   Result Value Ref Range    Troponin T, High Sensitivity <6 <=22 ng/L   CBC with platelets and differential   Result Value Ref Range    WBC Count 13.7 (H) 4.0 - 11.0 10e3/uL    RBC Count 5.70 4.40 - 5.90 10e6/uL    Hemoglobin 17.0 13.3 - 17.7 g/dL    Hematocrit 48.9 40.0 - 53.0 %    MCV 86 78 - 100 fL    MCH 29.8 26.5 - 33.0 pg    MCHC 34.8 31.5 - 36.5 g/dL    RDW 12.2 10.0 - 15.0 %    Platelet Count 192 150 - 450 10e3/uL    % Neutrophils 83 %    % Lymphocytes 11 %    % Monocytes 6 %    % Eosinophils 0 %    % Basophils 0 %    % Immature Granulocytes 1 %    NRBCs per 100 WBC 0 <1 /100    Absolute Neutrophils 11.3 (H) 1.6 - 8.3 10e3/uL    Absolute Lymphocytes 1.5 0.8 - 5.3 10e3/uL    Absolute Monocytes 0.8 0.0 - 1.3 10e3/uL    Absolute Eosinophils 0.0 0.0 - 0.7 10e3/uL    Absolute Basophils 0.0 0.0 - 0.2 10e3/uL    Absolute Immature Granulocytes 0.1 <=0.4 10e3/uL    Absolute NRBCs 0.0 10e3/uL   Lactic acid whole blood   Result Value Ref Range    Lactic Acid 1.7 0.7 - 2.0 mmol/L     Medications   ondansetron (ZOFRAN) injection 4 mg (4 mg Intravenous $Given 6/24/25 0826)   ketorolac (TORADOL) injection 15 mg (15 mg Intravenous $Given 6/24/25 0827)   morphine (PF) injection 4 mg (4 mg Intravenous $Given 6/24/25 0827)   morphine (PF) injection  4 mg (4 mg Intravenous $Given 6/24/25 1033)   lactated ringers BOLUS 1,000 mL (0 mLs Intravenous Stopped 6/24/25 1158)          Critical care was not performed.     Medical Decision Making  The patient's presentation was of high complexity (an acute health issue posing potential threat to life or bodily function).    The patient's evaluation involved:  ordering and/or review of 2 test(s) in this encounter (see separate area of note for details)    The patient's management necessitated high risk (a parenteral controlled substance).    Assessment & Plan      Left colicky flank pain radiating to groin with tenderness in abdomen and left testicle/scrotum   will screen for testicular torsion which would be atypical, bowel obstruction, or kidney stone. Treat with generous pain medications, IV Toradol, morphine x 2, IV fluids.   CT abdomen pelvis, urinalysis, STI tests, reassess after initial workup.    Prior to discharge, CT abdomen pelvis showed 3 mm kidney stone which is causing renal colic.  Patient's pain is controlled after IV pain medication.  Will discharge with Zofran and as needed pain medication.      Urology consult and kidney stone strainer        I have reviewed the nursing notes. I have reviewed the findings, diagnosis, plan and need for follow up with the patient.    Discharge Medication List as of 6/24/2025 11:58 AM        START taking these medications    Details   acetaminophen (TYLENOL) 500 MG tablet Take 2 tablets (1,000 mg) by mouth every 6 hours for 7 days., Disp-56 tablet, R-0, E-Prescribe      !! dimenhyDRINATE (DRAMAMINE) 50 MG tablet Take 1 tablet (50 mg) by mouth at bedtime for 7 days., Disp-7 tablet, R-0, E-Prescribe      !! dimenhyDRINATE (DRAMAMINE) 50 MG tablet Take 1 tablet (50 mg) by mouth every 6 hours as needed for other (kidney stone pain management)., Disp-28 tablet, R-0, E-Prescribe      ibuprofen (ADVIL/MOTRIN) 200 MG tablet Take 2 tablets (400 mg) by mouth every 6 hours for 7 days.,  Disp-56 tablet, R-0, E-Prescribe      ondansetron (ZOFRAN ODT) 4 MG ODT tab Take 1 tablet (4 mg) by mouth every 8 hours as needed for nausea., Disp-9 tablet, R-0, E-Prescribe       !! - Potential duplicate medications found. Please discuss with provider.          Final diagnoses:   Renal colic on left side   Kidney stone       Elbert Dowling MD   [facility] EMERGENCY DEPARTMENT  6/24/2025        Elbert Dowling MD  06/24/25 [id number]

## 2025-06-24 NOTE — ED TRIAGE NOTES
Patient state having left side flank pain and abdominal pain started last night, has progressed worsened through the night. State the pain radiate to the left side and into groin. Has taken miralax and Asprin, without a relief. Currently rating pain 9/10.

## 2025-07-12 ENCOUNTER — HEALTH MAINTENANCE LETTER (OUTPATIENT)
Age: 40
End: 2025-07-12

## (undated) DEVICE — PROBE DOPPLER STR VTI 20MHZ DISP 108200

## (undated) DEVICE — DRAPE LAP TRANSVERSE 29421

## (undated) DEVICE — PACK MINOR CUSTOM ASC

## (undated) DEVICE — PREP CHLORAPREP 26ML TINTED ORANGE  260815

## (undated) DEVICE — SYR 20ML LL W/O NDL 302830

## (undated) DEVICE — NDL ANGIOCATH 14GA 1.25" 3068

## (undated) DEVICE — SU SILK 2-0 TIE 12X30" A305H

## (undated) DEVICE — ESU ELEC NDL 1" COATED/INSULATED E1465

## (undated) DEVICE — SU CHROMIC 3-0 SH 27" G122H

## (undated) DEVICE — ESU GROUND PAD ADULT W/CORD E7507

## (undated) DEVICE — BASIN EMESIS STERILE  SSK9005A

## (undated) DEVICE — SU SILK 4-0 TIE 12X30" A303H

## (undated) DEVICE — SOL BENZOIN 0.5OZ

## (undated) DEVICE — SU MONOCRYL 4-0 PS-2 18" UND Y496G

## (undated) DEVICE — DRSG STERI STRIP 1/2X4" R1547

## (undated) DEVICE — NDL BLUNT 18GA 1" W/O FILTER 305181

## (undated) DEVICE — BLADE CLIPPER SGL USE 9680

## (undated) DEVICE — LINEN TOWEL PACK X5 5464

## (undated) DEVICE — SPONGE RAY-TEC 4X8" 7318

## (undated) DEVICE — SYR 03ML LL W/O NDL 309657

## (undated) DEVICE — VESSEL LOOPS RED MINI 31145710

## (undated) DEVICE — VESSEL LOOPS BLUE MINI

## (undated) DEVICE — SOL NACL 0.9% IRRIG 500ML BOTTLE 2F7123

## (undated) DEVICE — DRAPE MICROSCOPE LEICA 54X150" AR8033650

## (undated) DEVICE — ESU CORD BIPOLAR GREEN 10-4000

## (undated) DEVICE — GLOVE PROTEXIS W/NEU-THERA 7.5  2D73TE75

## (undated) DEVICE — DRSG PRIMAPORE 02X3" 7133

## (undated) DEVICE — NDL ANGIOCATH 20GA 1.25" 4056

## (undated) RX ORDER — ONDANSETRON 2 MG/ML
INJECTION INTRAMUSCULAR; INTRAVENOUS
Status: DISPENSED
Start: 2019-10-08

## (undated) RX ORDER — PAPAVERINE HYDROCHLORIDE 30 MG/ML
INJECTION INTRAMUSCULAR; INTRAVENOUS
Status: DISPENSED
Start: 2019-10-08

## (undated) RX ORDER — FENTANYL CITRATE 50 UG/ML
INJECTION, SOLUTION INTRAMUSCULAR; INTRAVENOUS
Status: DISPENSED
Start: 2019-10-08

## (undated) RX ORDER — CEFAZOLIN SODIUM 1 G/3ML
INJECTION, POWDER, FOR SOLUTION INTRAMUSCULAR; INTRAVENOUS
Status: DISPENSED
Start: 2019-10-08

## (undated) RX ORDER — PROPOFOL 10 MG/ML
INJECTION, EMULSION INTRAVENOUS
Status: DISPENSED
Start: 2019-10-08

## (undated) RX ORDER — BUPIVACAINE HYDROCHLORIDE 5 MG/ML
INJECTION, SOLUTION EPIDURAL; INTRACAUDAL
Status: DISPENSED
Start: 2019-10-08

## (undated) RX ORDER — LIDOCAINE HYDROCHLORIDE 20 MG/ML
INJECTION, SOLUTION EPIDURAL; INFILTRATION; INTRACAUDAL; PERINEURAL
Status: DISPENSED
Start: 2019-10-08

## (undated) RX ORDER — ACETAMINOPHEN 325 MG/1
TABLET ORAL
Status: DISPENSED
Start: 2019-10-08

## (undated) RX ORDER — HYDROMORPHONE HYDROCHLORIDE 1 MG/ML
INJECTION, SOLUTION INTRAMUSCULAR; INTRAVENOUS; SUBCUTANEOUS
Status: DISPENSED
Start: 2019-10-08

## (undated) RX ORDER — GABAPENTIN 300 MG/1
CAPSULE ORAL
Status: DISPENSED
Start: 2019-10-08

## (undated) RX ORDER — EPHEDRINE SULFATE 50 MG/ML
INJECTION, SOLUTION INTRAMUSCULAR; INTRAVENOUS; SUBCUTANEOUS
Status: DISPENSED
Start: 2019-10-08

## (undated) RX ORDER — DEXAMETHASONE SODIUM PHOSPHATE 4 MG/ML
INJECTION, SOLUTION INTRA-ARTICULAR; INTRALESIONAL; INTRAMUSCULAR; INTRAVENOUS; SOFT TISSUE
Status: DISPENSED
Start: 2019-10-08